# Patient Record
Sex: FEMALE | Race: BLACK OR AFRICAN AMERICAN | Employment: PART TIME | URBAN - METROPOLITAN AREA
[De-identification: names, ages, dates, MRNs, and addresses within clinical notes are randomized per-mention and may not be internally consistent; named-entity substitution may affect disease eponyms.]

---

## 2017-10-12 PROBLEM — O35.9XX0: Status: ACTIVE | Noted: 2017-10-12

## 2017-10-12 PROBLEM — O99.322: Status: ACTIVE | Noted: 2017-10-12

## 2017-10-12 PROBLEM — O99.332: Status: ACTIVE | Noted: 2017-10-12

## 2017-10-12 PROBLEM — A59.01 TRICHOMONAL VAGINITIS IN PREGNANCY IN SECOND TRIMESTER: Status: ACTIVE | Noted: 2017-10-12

## 2017-10-12 PROBLEM — O09.522 SUPERVISION OF ELDERLY MULTIGRAVIDA IN SECOND TRIMESTER: Status: ACTIVE | Noted: 2017-10-12

## 2017-10-12 PROBLEM — O09.32 LATE PRENATAL CARE AFFECTING PREGNANCY IN SECOND TRIMESTER: Status: ACTIVE | Noted: 2017-10-12

## 2017-10-12 PROBLEM — O23.592 TRICHOMONAL VAGINITIS IN PREGNANCY IN SECOND TRIMESTER: Status: ACTIVE | Noted: 2017-10-12

## 2017-10-12 PROBLEM — O35.9XX0: Status: RESOLVED | Noted: 2017-10-12 | Resolved: 2017-10-12

## 2017-10-12 PROBLEM — F19.10: Status: ACTIVE | Noted: 2017-10-12

## 2017-10-19 PROBLEM — Z36.82 NUCHAL TRANSLUCENCY OF FETUS ON PRENATAL ULTRASOUND: Status: ACTIVE | Noted: 2017-10-19

## 2017-11-09 PROBLEM — O09.523: Status: ACTIVE | Noted: 2017-10-12

## 2017-11-09 PROBLEM — O09.33 LATE PRENATAL CARE AFFECTING PREGNANCY IN THIRD TRIMESTER: Status: ACTIVE | Noted: 2017-10-12

## 2017-11-09 PROBLEM — O99.323 MATERNAL DRUG ABUSE IN THIRD TRIMESTER (HCC): Status: ACTIVE | Noted: 2017-10-12

## 2017-11-09 PROBLEM — O99.333 TOBACCO SMOKING COMPLICATING PREGNANCY, THIRD TRIMESTER: Status: ACTIVE | Noted: 2017-10-12

## 2017-11-09 PROBLEM — O35.BXX0 VENTRICULAR SEPTAL DEFECT (VSD) OF FETUS IN SINGLETON PREGNANCY, ANTEPARTUM: Status: ACTIVE | Noted: 2017-11-09

## 2017-12-07 PROBLEM — O23.592 TRICHOMONAL VAGINITIS IN PREGNANCY IN SECOND TRIMESTER: Status: RESOLVED | Noted: 2017-10-12 | Resolved: 2017-12-07

## 2017-12-07 PROBLEM — A59.01 TRICHOMONAL VAGINITIS IN PREGNANCY IN SECOND TRIMESTER: Status: RESOLVED | Noted: 2017-10-12 | Resolved: 2017-12-07

## 2018-01-14 ENCOUNTER — HOSPITAL ENCOUNTER (INPATIENT)
Age: 38
LOS: 2 days | Discharge: HOME | DRG: 560 | End: 2018-01-16
Attending: OBSTETRICS & GYNECOLOGY | Admitting: OBSTETRICS & GYNECOLOGY
Payer: COMMERCIAL

## 2018-01-14 ENCOUNTER — HOSPITAL ENCOUNTER (EMERGENCY)
Age: 38
Discharge: ANOTHER HOSPITAL | DRG: 560 | End: 2018-01-14
Attending: EMERGENCY MEDICINE
Payer: COMMERCIAL

## 2018-01-14 VITALS
DIASTOLIC BLOOD PRESSURE: 82 MMHG | HEIGHT: 64 IN | OXYGEN SATURATION: 100 % | WEIGHT: 236 LBS | HEART RATE: 84 BPM | BODY MASS INDEX: 40.29 KG/M2 | TEMPERATURE: 98.7 F | SYSTOLIC BLOOD PRESSURE: 149 MMHG | RESPIRATION RATE: 22 BRPM

## 2018-01-14 LAB
ABO + RH BLD: NORMAL
AMPHET UR QL SCN: NEGATIVE
BACTERIA SPEC CULT: NORMAL
BARBITURATES UR QL SCN: NEGATIVE
BASOPHILS # BLD: 0 K/UL (ref 0–0.2)
BASOPHILS NFR BLD: 0 % (ref 0–2)
BENZODIAZ UR QL: NEGATIVE
BLOOD GROUP ANTIBODIES SERPL: NORMAL
CANNABINOIDS UR QL SCN: POSITIVE
COCAINE UR QL SCN: NEGATIVE
DIFFERENTIAL METHOD BLD: ABNORMAL
EOSINOPHIL # BLD: 0 K/UL (ref 0–0.8)
EOSINOPHIL NFR BLD: 0 % (ref 0.5–7.8)
ERYTHROCYTE [DISTWIDTH] IN BLOOD BY AUTOMATED COUNT: 16.6 % (ref 11.9–14.6)
GLUCOSE BLD STRIP.AUTO-MCNC: 102 MG/DL (ref 65–100)
GLUCOSE BLD STRIP.AUTO-MCNC: 97 MG/DL (ref 65–100)
HCT VFR BLD AUTO: 34.5 % (ref 35.8–46.3)
HGB BLD-MCNC: 10.7 G/DL (ref 11.7–15.4)
IMM GRANULOCYTES # BLD: 0 K/UL (ref 0–0.5)
IMM GRANULOCYTES NFR BLD AUTO: 0 % (ref 0–5)
LYMPHOCYTES # BLD: 0.8 K/UL (ref 0.5–4.6)
LYMPHOCYTES NFR BLD: 9 % (ref 13–44)
MCH RBC QN AUTO: 20.9 PG (ref 26.1–32.9)
MCHC RBC AUTO-ENTMCNC: 31 G/DL (ref 31.4–35)
MCV RBC AUTO: 67.3 FL (ref 79.6–97.8)
METHADONE UR QL: NEGATIVE
MONOCYTES # BLD: 0.3 K/UL (ref 0.1–1.3)
MONOCYTES NFR BLD: 4 % (ref 4–12)
NEUTS SEG # BLD: 7.9 K/UL (ref 1.7–8.2)
NEUTS SEG NFR BLD: 87 % (ref 43–78)
OPIATES UR QL: POSITIVE
PCP UR QL: NEGATIVE
PLATELET # BLD AUTO: 219 K/UL (ref 150–450)
PMV BLD AUTO: ABNORMAL FL (ref 10.8–14.1)
RBC # BLD AUTO: 5.13 M/UL (ref 4.05–5.25)
SERVICE CMNT-IMP: NORMAL
SPECIMEN EXP DATE BLD: NORMAL
WBC # BLD AUTO: 9.1 K/UL (ref 4.3–11.1)

## 2018-01-14 PROCEDURE — 82962 GLUCOSE BLOOD TEST: CPT

## 2018-01-14 PROCEDURE — 74011250636 HC RX REV CODE- 250/636: Performed by: OBSTETRICS & GYNECOLOGY

## 2018-01-14 PROCEDURE — 77030005518 HC CATH URETH FOL 2W BARD -B

## 2018-01-14 PROCEDURE — 4A1HXCZ MONITORING OF PRODUCTS OF CONCEPTION, CARDIAC RATE, EXTERNAL APPROACH: ICD-10-PCS | Performed by: OBSTETRICS & GYNECOLOGY

## 2018-01-14 PROCEDURE — 74011250636 HC RX REV CODE- 250/636

## 2018-01-14 PROCEDURE — 74011250637 HC RX REV CODE- 250/637

## 2018-01-14 PROCEDURE — 65270000029 HC RM PRIVATE

## 2018-01-14 PROCEDURE — 36415 COLL VENOUS BLD VENIPUNCTURE: CPT | Performed by: OBSTETRICS & GYNECOLOGY

## 2018-01-14 PROCEDURE — 74011250637 HC RX REV CODE- 250/637: Performed by: OBSTETRICS & GYNECOLOGY

## 2018-01-14 PROCEDURE — 86850 RBC ANTIBODY SCREEN: CPT | Performed by: OBSTETRICS & GYNECOLOGY

## 2018-01-14 PROCEDURE — 99285 EMERGENCY DEPT VISIT HI MDM: CPT | Performed by: EMERGENCY MEDICINE

## 2018-01-14 PROCEDURE — 80307 DRUG TEST PRSMV CHEM ANLYZR: CPT | Performed by: OBSTETRICS & GYNECOLOGY

## 2018-01-14 PROCEDURE — 87641 MR-STAPH DNA AMP PROBE: CPT | Performed by: OBSTETRICS & GYNECOLOGY

## 2018-01-14 PROCEDURE — 59409 OBSTETRICAL CARE: CPT | Performed by: EMERGENCY MEDICINE

## 2018-01-14 PROCEDURE — 74011000250 HC RX REV CODE- 250: Performed by: OBSTETRICS & GYNECOLOGY

## 2018-01-14 PROCEDURE — 85025 COMPLETE CBC W/AUTO DIFF WBC: CPT | Performed by: OBSTETRICS & GYNECOLOGY

## 2018-01-14 RX ORDER — OXYTOCIN/RINGER'S LACTATE 15/250 ML
250 PLASTIC BAG, INJECTION (ML) INTRAVENOUS ONCE
Status: ACTIVE | OUTPATIENT
Start: 2018-01-14 | End: 2018-01-15

## 2018-01-14 RX ORDER — MISOPROSTOL 200 UG/1
200 TABLET ORAL ONCE
Status: COMPLETED | OUTPATIENT
Start: 2018-01-14 | End: 2018-01-14

## 2018-01-14 RX ORDER — OXYTOCIN/RINGER'S LACTATE 30/500 ML
PLASTIC BAG, INJECTION (ML) INTRAVENOUS
Status: COMPLETED
Start: 2018-01-14 | End: 2018-01-14

## 2018-01-14 RX ORDER — OXYCODONE HYDROCHLORIDE 5 MG/1
5 TABLET ORAL
Status: DISCONTINUED | OUTPATIENT
Start: 2018-01-14 | End: 2018-01-16 | Stop reason: HOSPADM

## 2018-01-14 RX ORDER — LIDOCAINE HYDROCHLORIDE 20 MG/ML
JELLY TOPICAL AS NEEDED
Status: DISCONTINUED | OUTPATIENT
Start: 2018-01-14 | End: 2018-01-14 | Stop reason: HOSPADM

## 2018-01-14 RX ORDER — PRENATAL VIT 96/IRON FUM/FOLIC 27MG-0.8MG
1 TABLET ORAL DAILY
Status: DISCONTINUED | OUTPATIENT
Start: 2018-01-15 | End: 2018-01-16 | Stop reason: HOSPADM

## 2018-01-14 RX ORDER — OXYCODONE HYDROCHLORIDE 5 MG/1
10 TABLET ORAL
Status: DISCONTINUED | OUTPATIENT
Start: 2018-01-14 | End: 2018-01-16 | Stop reason: HOSPADM

## 2018-01-14 RX ORDER — SODIUM CHLORIDE, SODIUM LACTATE, POTASSIUM CHLORIDE, CALCIUM CHLORIDE 600; 310; 30; 20 MG/100ML; MG/100ML; MG/100ML; MG/100ML
1000 INJECTION, SOLUTION INTRAVENOUS AS NEEDED
Status: DISCONTINUED | OUTPATIENT
Start: 2018-01-14 | End: 2018-01-16 | Stop reason: HOSPADM

## 2018-01-14 RX ORDER — MISOPROSTOL 200 UG/1
TABLET ORAL
Status: COMPLETED
Start: 2018-01-14 | End: 2018-01-14

## 2018-01-14 RX ORDER — LIDOCAINE HYDROCHLORIDE 10 MG/ML
INJECTION INFILTRATION; PERINEURAL
Status: ACTIVE
Start: 2018-01-14 | End: 2018-01-14

## 2018-01-14 RX ORDER — METFORMIN HYDROCHLORIDE 500 MG/1
500 TABLET ORAL 2 TIMES DAILY WITH MEALS
Status: DISCONTINUED | OUTPATIENT
Start: 2018-01-14 | End: 2018-01-16 | Stop reason: HOSPADM

## 2018-01-14 RX ORDER — PROMETHAZINE HYDROCHLORIDE 25 MG/1
25 TABLET ORAL
Status: DISCONTINUED | OUTPATIENT
Start: 2018-01-14 | End: 2018-01-16 | Stop reason: HOSPADM

## 2018-01-14 RX ORDER — HYDROCODONE BITARTRATE AND ACETAMINOPHEN 5; 325 MG/1; MG/1
1 TABLET ORAL
Status: DISCONTINUED | OUTPATIENT
Start: 2018-01-14 | End: 2018-01-16 | Stop reason: HOSPADM

## 2018-01-14 RX ORDER — PROMETHAZINE HYDROCHLORIDE 25 MG/ML
INJECTION, SOLUTION INTRAMUSCULAR; INTRAVENOUS
Status: COMPLETED
Start: 2018-01-14 | End: 2018-01-14

## 2018-01-14 RX ORDER — SIMETHICONE 80 MG
80 TABLET,CHEWABLE ORAL
Status: DISCONTINUED | OUTPATIENT
Start: 2018-01-14 | End: 2018-01-16 | Stop reason: HOSPADM

## 2018-01-14 RX ORDER — DIPHENHYDRAMINE HCL 25 MG
25 CAPSULE ORAL
Status: DISCONTINUED | OUTPATIENT
Start: 2018-01-14 | End: 2018-01-16 | Stop reason: HOSPADM

## 2018-01-14 RX ORDER — ZOLPIDEM TARTRATE 5 MG/1
5 TABLET ORAL
Status: DISCONTINUED | OUTPATIENT
Start: 2018-01-14 | End: 2018-01-16 | Stop reason: HOSPADM

## 2018-01-14 RX ORDER — DOCUSATE SODIUM 100 MG/1
100 CAPSULE, LIQUID FILLED ORAL
Status: DISCONTINUED | OUTPATIENT
Start: 2018-01-14 | End: 2018-01-16 | Stop reason: HOSPADM

## 2018-01-14 RX ORDER — MORPHINE SULFATE 2 MG/ML
4 INJECTION, SOLUTION INTRAMUSCULAR; INTRAVENOUS ONCE
Status: COMPLETED | OUTPATIENT
Start: 2018-01-14 | End: 2018-01-14

## 2018-01-14 RX ORDER — MORPHINE SULFATE 2 MG/ML
INJECTION, SOLUTION INTRAMUSCULAR; INTRAVENOUS
Status: COMPLETED
Start: 2018-01-14 | End: 2018-01-14

## 2018-01-14 RX ORDER — IBUPROFEN 600 MG/1
600 TABLET ORAL
Status: DISCONTINUED | OUTPATIENT
Start: 2018-01-14 | End: 2018-01-16 | Stop reason: HOSPADM

## 2018-01-14 RX ADMIN — HYDROCODONE BITARTRATE AND ACETAMINOPHEN 1 TABLET: 5; 325 TABLET ORAL at 17:20

## 2018-01-14 RX ADMIN — PROMETHAZINE HYDROCHLORIDE 12.5 MG: 25 INJECTION INTRAMUSCULAR; INTRAVENOUS at 12:14

## 2018-01-14 RX ADMIN — METFORMIN HYDROCHLORIDE 500 MG: 500 TABLET, FILM COATED ORAL at 17:20

## 2018-01-14 RX ADMIN — LIDOCAINE HYDROCHLORIDE: 20 JELLY TOPICAL at 12:45

## 2018-01-14 RX ADMIN — MORPHINE SULFATE 4 MG: 2 INJECTION, SOLUTION INTRAMUSCULAR; INTRAVENOUS at 12:15

## 2018-01-14 RX ADMIN — IBUPROFEN 600 MG: 600 TABLET ORAL at 20:24

## 2018-01-14 RX ADMIN — MISOPROSTOL 200 MCG: 200 TABLET ORAL at 12:07

## 2018-01-14 RX ADMIN — DOCUSATE SODIUM 100 MG: 100 CAPSULE, LIQUID FILLED ORAL at 17:20

## 2018-01-14 RX ADMIN — OXYCODONE HYDROCHLORIDE 10 MG: 5 TABLET ORAL at 21:55

## 2018-01-14 RX ADMIN — WITCH HAZEL 1 PAD: 500 SOLUTION RECTAL; TOPICAL at 17:20

## 2018-01-14 RX ADMIN — SODIUM CHLORIDE, SODIUM LACTATE, POTASSIUM CHLORIDE, AND CALCIUM CHLORIDE 1000 ML/HR: 600; 310; 30; 20 INJECTION, SOLUTION INTRAVENOUS at 11:40

## 2018-01-14 RX ADMIN — OXYTOCIN 30000 MILLI-UNITS: 10 INJECTION, SOLUTION INTRAMUSCULAR; INTRAVENOUS at 11:50

## 2018-01-14 NOTE — PROGRESS NOTES
Dr Carlena Buerger given update on patient status; reported elevated blood pressure readings, hx of Type 2 Diabetes, currently taking Metformin 1000 mg ER BID, positive UDS; new order received for B/P parameters and Metformin 500 mg BID    Metformin ER unavailable per Berkley Hummel, PHARMD, will substitute with metformin    Trino Mcguire, primary RN, notified

## 2018-01-14 NOTE — PROGRESS NOTES
SBAR IN Report: Mother    Verbal report received from Willian Sterling on this patient, who is now being transferred from [facility] for routine progression of care. The patient is not wearing a green \"Anesthesia-Duramorph\" band. Report consisted of patient's Situation, Background, Assessment and Recommendations (SBAR). [place] ID bands were compared with the identification form, and verified with the patient and transferring nurse. Information from the SBAR, Intake/Output and Recent Results and the [place] Report was reviewed with the transferring nurse; opportunity for questions and clarification provided.

## 2018-01-14 NOTE — ED PROVIDER NOTES
HPI Comments: Patient is a 28-year-old female  who is approximately 38-1/2 weeks pregnant who was brought to the emergency department today with some contractions. She states it started several hours ago with a little bit of bleeding. She reports she is raiza about every 3 minutes. She states that the baby's father got scared and nervous and drove to the wrong hospital by accident. She denies any complications with her prenatal care. Patient is a 44 y.o. female presenting with contractions. The history is provided by the patient. Contractions    This is a new problem. The current episode started 1 to 2 hours ago. The problem occurs constantly. The problem has been gradually worsening. Past Medical History:   Diagnosis Date    Abnormal Papanicolaou smear of cervix     resolved    Anemia     Calculus of gallbladder 2016    Chlamydia     tx    Choledocholithiasis 2016    CKD (chronic kidney disease)     Diabetes (HCC)     metformin only- does not take BS at home- does not know last A1c- denies hypoglycemia    Elevated fasting blood sugar     Endocrine disease     hypothyroidism    Essential hypertension     no current medications--since     Genital herpes     never had an outbreak    Genital herpes, unspecified     Gestational diabetes     Hypertension     CHTN not currently on med    Hyperthyroidism     was on Methimazole during last preg. not currently on anything.     Marijuana use     twice daily per pt    Obesity (BMI 30-39.9) 2016    BMI 37.2    Smoker     5 cig daily since age 14       Past Surgical History:   Procedure Laterality Date    HX GI      EGD    HX LAP CHOLECYSTECTOMY           Family History:   Problem Relation Age of Onset    Cancer Mother      cervical    Diabetes Mother     Stroke Mother     Cancer Maternal Grandmother      breast ca    Diabetes Maternal Grandmother     Heart Disease Maternal Grandmother        Social History Social History    Marital status: SINGLE     Spouse name: N/A    Number of children: N/A    Years of education: N/A     Occupational History    Not on file. Social History Main Topics    Smoking status: Current Every Day Smoker     Packs/day: 0.25     Years: 18.00     Start date: 12/14/1994    Smokeless tobacco: Never Used      Comment: 4-5 cigarettes daily    Alcohol use Yes      Comment: 2-3 x year    Drug use: Yes     Special: Marijuana      Comment: 1 x 2 times daily last smoked, quit 09/21/17    Sexual activity: Yes     Partners: Male     Other Topics Concern    Not on file     Social History Narrative         ALLERGIES: Review of patient's allergies indicates no known allergies. Review of Systems   Constitutional: Negative. HENT: Negative. Respiratory: Negative. Cardiovascular: Negative. Endocrine: Negative. Neurological: Negative. Vitals:    01/14/18 1033 01/14/18 1034 01/14/18 1035   BP: 174/84     Pulse:  (!) 107 96   SpO2:  100% 100%            Physical Exam   Constitutional: She appears well-developed and well-nourished. HENT:   Head: Normocephalic and atraumatic. Cardiovascular: Normal rate, regular rhythm and intact distal pulses. Pulmonary/Chest: Effort normal and breath sounds normal.   Abdominal:   Abdomen abdomen having contractions   Genitourinary:   Genitourinary Comments: initial bimanual examination with sterile gloves showed a dilated cervix however the head was still high in the pelvis. There was some clear fluid leakage and a small amount of blood on the glove. We did not have any nitrazine paper available for testing. Musculoskeletal: She exhibits no edema. Skin: Skin is warm and dry. No rash noted. MDM  Number of Diagnoses or Management Options  Diagnosis management comments: A few minutes after I left the room and was trying to contact obstetrics the nurse jomar for help back in the room.   When I reentered the room the patient had already delivered a male fetus. The nurse was holding the baby. We immediately wrapped the baby warmed house and dried his color was pink and warm with an Apgar score of 9. We provided some suction to the mouth with the baby we then clamped and cut the cord. Over the next 10-15 minutes the mother had a few more contractions and gradually passed the placenta on her own in full. EMS arrived to transport mother and baby to the [facility] for continued care. Discharged her story spoken with the patient's obstetrician who is accepted the patient at the other hospital.    Risk of Complications, Morbidity, and/or Mortality  Presenting problems: high  Diagnostic procedures: minimal  Management options: high    Patient Progress  Patient progress: improved    ED Course   Voice dictation software was used during the making of this note. This software is not perfect and grammatical and other typographical errors may be present. This note has been proofread, but may still contain errors.   Sim Jay MD; 2018 @11:06 AM   ===================================================================        Procedures

## 2018-01-14 NOTE — ED NOTES
Baby delivered in ER  Boy, healthy appearing, o2 sat 96% RA, heart rate 155. Baby boy placed in warmed and wrapped in warm blankets. Umbilical cord clamped by MDs.  Mother and baby both stable at this time

## 2018-01-14 NOTE — ED NOTES
Pt with contractions less than 2 min apart. Pt stated \"I have to push\" and this RN noted baby head protruding, Medic at bedside initiating PIV. Baby delivered on stretcher, Infant turned face down and back patted, cry initiated. Assistance arrived at the bedside, infant wrapped in warm blanket and to MD for suction and assessment.

## 2018-01-14 NOTE — ED NOTES
L&D Triage at [facility] [address] called and made aware of patient transfer. Cyndy Meza called for emergency transfer.

## 2018-01-14 NOTE — PROGRESS NOTES
Patient reported SROM before delivery of infant       01/14/18 1042   Membranes   Membrane Status SROM   Rupture Date 01/14/18   Rupture Time 1042   Amniotic Fluid Description Unable to determine

## 2018-01-14 NOTE — PROGRESS NOTES
Unable to complete admission questions; patient using telephone; stated children and FOB on side of road, ran out of gas, asking for money

## 2018-01-14 NOTE — PROGRESS NOTES
UDS resulted- Pt has already been medicated with MS and Phenergan at time of urine collection:    Results for Trace Gandhi (MRN [id number]) as of 1/14/2018 14:31   Ref.  Range 1/14/2018 13:06   AMPHETAMINES Latest Units:   NEGATIVE   BARBITURATES Latest Units:   NEGATIVE   BENZODIAZEPINES Latest Units:   NEGATIVE   COCAINE Latest Units:   NEGATIVE   METHADONE Latest Units:   NEGATIVE   OPIATES Latest Units:   POSITIVE   PCP(PHENCYCLIDINE) Latest Units:   NEGATIVE   THC (TH-CANNABINOL) Latest Units:   POSITIVE   DRUG SCREEN, URINE Unknown Rpt

## 2018-01-14 NOTE — PROGRESS NOTES
SBAR OUT Report: Mother    Verbal report given to Carolyn Cheney RN (full name & credentials) on this patient, who is now being transferred to Mom Infant (unit) for routine progression of care. The patient is not wearing a green \"Anesthesia-Duramorph\" band. Report consisted of patient's Situation, Background, Assessment and Recommendations (SBAR).  ID bands were compared with the identification form, and verified with the patient and receiving nurse. Information from the SBAR, [place], Intake/Output, MAR and Recent Results and the [place] Report was reviewed with the receiving nurse; opportunity for questions and clarification provided.

## 2018-01-14 NOTE — PROGRESS NOTES
Clot expelled during fundal assessment; patient reported history of HSV 1 & 2; denied taking valtrex prophylaxis; patient stated unaware if she has had outbreak during pregnancy or cannot recall last outbreak, stated has \"boil\" on buttocks at this time       01/14/18 1151   Maternal Vital Signs   Pulse (Heart Rate) 81   /89   MAP (Calculated) 111   Postpartum Assessment   Fundus Firm   Fundus location At umbilicus; Midline   Lochia Heavy; Clots;Rubra; Pad change  (baseball sized clot expelled)   Pre-Eclampsia Assessment   Headache No   Visual Disturbances No   Epigastric Pain No   Respiratory Pattern Regular   Spinal Dermatomes   Motor Function Ambulate w/o assistance

## 2018-01-14 NOTE — H&P
[facility] H&P      Assessment/Plan    Problem List  Date Reviewed: 2018          Codes Class    Normal delivery at term ICD-10-CM: O80  ICD-9-CM: 650         Ventricular septal defect (VSD) of fetus in smith pregnancy, antepartum ICD-10-CM: O35. 8XX0  ICD-9-CM: 655.80     Overview Addendum 2017 11:28 AM by Lele Pantoja MD     2017 at [facility]:  Appropriate fetal growth, reassuring fetal status; small suspected VSD seen. AC 55%, overall 64%, LOLA 17 cm, UA Dopplers WNL, BPP 8/8.  2017 [facility]: Peds cards echo today, see report from Dr Tima Barrow; unable to see VSDs. Recommend  follow-up 2 weeks after delivery. 2017 at [facility]: Appropriate fetal growth without IUGR, reassuring fetal status. AC 64, overall 49%, LOLA 17.2 cm, UA Dopplers WNL, BPP 8/8. Small VSD seen previously and evaluated by [facility]. · Notify Pediatricians at delivery to evaluate. · Set Up  echo with Dr. Tima Barrow 2 weeks after delivery as outpatient. Elderly multigravida, currently pregnant, third trimester ICD-10-CM: O09.523  ICD-9-CM: 659.63     Overview Addendum 2017 11:12 AM by Josey Schuler     10/12/2017 at [facility]:  AC 58%, overall 56%, LOLA 25 cm, CL 3.7cm  2017 at [facility]:  Appropriate fetal growth, reassuring fetal status; suspected VSD seen. AC 55%, overall 64%, LOLA 17 cm, UA Dopplers WNL, BPP 8/8.  2017 at [facility]:  Appropriate fetal growth, reassuring fetal status; probable small VSDs. AC 58%, overall 55%, LOLA 19.1 cm, UA Dopplers WNL, BPP 8/8.  2017 at [facility]: Appropriate fetal growth without IUGR, reassuring fetal status. AC 64, overall 49%, LOLA 17.2 cm, UA Dopplers WNL, BPP 8/8. Small VSD seen previously and evaluated by [facility].  Unable to evaluate heart due to fetal position today               Tobacco smoking complicating pregnancy, third trimester ICD-10-CM: O99.333  ICD-9-CM: 649.03     Overview Addendum 2017 10:45 AM by Temitope Chacon 10/12/2017 at [facility]:  Admits to smoking 4-5 cig a day  10/19/2017 at [facility]:  Currently smoking 5 cigarettes/day. 11/9/2017 at [facility]:  Smoking 3-4 cigarettes/day. 12/7/2017 at [facility]:  Continues to smoke 3-4 cigarettes/day. 12/28/2017 at [facility]: Smoking 4-5 cigarettes daily      · The patient was counseled on the dangers of tobacco use, and was advised to quit. Reviewed strategies to maximize success, including removing cigarettes and smoking materials from environment. Late prenatal care affecting pregnancy in third trimester ICD-10-CM: O09.33  ICD-9-CM: V23.7     Overview Addendum 11/9/2017  9:34 AM by Katelin Faria RN     10/12/2017 at [facility]:  Recently found out she was pregnant 3 weeks ago 10/3 at approx 21 weeks. Was not taking any medications, was smoking and admits to smoking THC 2 times daily until 10/3. Maternal drug abuse in third trimester ICD-10-CM: O99.323, F19.10  ICD-9-CM: 648.43, 305.90     Overview Addendum 11/9/2017  9:34 AM by Katelin Faria RN     10/12/2017 at [facility]:   As of 3 weeks ago was using THC twice daily. None since then. UDS + on 10/3/17 for THC. Pre-exist hyp chronic kidney disease comp preg, third tri ICD-10-CM: O10.213, I12.9, N18.9  ICD-9-CM: 642.23, 403.90, 585.9     Overview Addendum 12/28/2017 11:42 AM by Jeffry Waggoner MD     12/28/20178796-ETYH-20 hour urine was 400 mgs, BP normal, this is probably due to underlying renal disease. At high risk of developing preeclampsia. · Repeat preeclamptic labs if SBP > 139. · Preeclamptic warnings stressed. Maternal obesity, antepartum, third trimester ICD-10-CM: O99.213  ICD-9-CM: 649.13, 278.00         Pre-existing hypertension in third trimester, antepartum ICD-10-CM: O10.913  ICD-9-CM: 642.03     Overview Addendum 12/28/2017 11:45 AM by Jeffry Waggoner MD       12/28/2017 at [facility]: BP normal again, no meds currently, denies preeclamptic symptoms.   Has not had any BP elevations this pregnancy but does have underlying renal disease with 400 mgs Proteinuria. 1-2+ edema present, no significant change. · No indication for delivery before 39 weeks at this time. · If HTN occurs, needs preeclampsia work up. · Preeclamptic warnings stressed. Pre-existing type 2 diabetes mellitus during pregnancy in third trimester ICD-10-CM: O24.113  ICD-9-CM: 648.03, 250.00     Overview Addendum 12/28/2017 11:47 AM by Faye Reynolds MD     History of DM2. States she took Metformin with last preg. A1c on 10/3 was 6.1    12/28/2017 at [facility]: Continues on Metformin 1000 mg ER BID without problem. Checking glucose and has brought glucometer today with results. Ranges from 66 to 162, most fastings elevated, QID compliance continues to be an issue. I think this control is as good as we can get. Recommendations and Overall Plan  · Stressed need to send logs weekly to our office and your office. · Weekly fetal testing in OB office scheduled by BPP. · Induce at 39 weeks, January 19th, sooner if maternal HTN or preeclampsia developes. · No MFM follow up scheduled, refer back or call for any problems or questions. Hyperthyroidism affecting pregnancy in third trimester ICD-10-CM: O99.283, E05.90  ICD-9-CM: 648.13, 242.90     Overview Addendum 12/28/2017 10:35 AM by Kyara Primus     Was previously on methimazole. Hasn't taken for last 2 years  10/19/2017 at [facility]:  Most recent TFT's reviewed. Not currently on meds. 11/9/2017 at [facility]:  Most recent TFT's noted below. Normal free T4, not currently on meds. Does not have hyperthyroidism. 12/28/2017 at [facility]: Most recent TFT's noted done 12/12/2017: T4 Free 0.94 and TSH 0.11 (L). Not currently on meds. 10/3/2017                        T4, Free 1.13                                     TSH 0.01 (L)                                  · Goal to keep free T4 within normal limits.                    Subjective    Shweta Becerra 44 y.o. G6  38w2d  presented to L&D S/P precipitous  at [facility] ED.  (+) vag bleeding with PP pain. Placenta del spont (per pt). OB History    Para Term  AB Living   6 3 3  2 3   SAB TAB Ectopic Molar Multiple Live Births   2    0 3      # Outcome Date GA Lbr Jose Juan/2nd Weight Sex Delivery Anes PTL Lv   6 Current            5 Term 09/23/15 37w3d  7 lb 9.5 oz (3.445 kg) F VAGINAL DELI None N MYRIAM   4 Term 09 38w0d  7 lb 14 oz (3.572 kg) F Vag-Spont None  MYRIAM   3 Term 10/24/94 37w0d  6 lb 5 oz (2.863 kg) F Vag-Spont  N MYRIAM   2 SAB  5w0d          1 SAB  9w0d                 Past Medical History:   Diagnosis Date    Abnormal Papanicolaou smear of cervix     resolved    Anemia     Calculus of gallbladder 2016    Chlamydia     tx    Choledocholithiasis 2016    CKD (chronic kidney disease)     Diabetes (HCC)     metformin only- does not take BS at home- does not know last A1c- denies hypoglycemia    Elevated fasting blood sugar     Endocrine disease     hypothyroidism    Essential hypertension     no current medications--since     Genital herpes     never had an outbreak    Genital herpes, unspecified     Gestational diabetes     Herpes gestationis     unsure    Herpes simplex virus (HSV) infection     Hypertension     CHTN not currently on med    Hyperthyroidism     was on Methimazole during last preg. not currently on anything.     Marijuana use     twice daily per pt    Obesity (BMI 30-39.9) 2016    BMI 37.2    Smoker     5 cig daily since age 14       Past Surgical History:   Procedure Laterality Date    HX GI      EGD    HX LAP CHOLECYSTECTOMY         Family History   Problem Relation Age of Onset    Cancer Mother      cervical    Diabetes Mother     Stroke Mother     Cancer Maternal Grandmother      breast ca    Diabetes Maternal Grandmother     Heart Disease Maternal Grandmother        Social History     Social History    Marital status: SINGLE Spouse name: N/A    Number of children: N/A    Years of education: N/A     Occupational History    Not on file. Social History Main Topics    Smoking status: Current Every Day Smoker     Packs/day: 0.25     Years: 18.00     Start date: 12/14/1994    Smokeless tobacco: Never Used      Comment: 4-5 cigarettes daily    Alcohol use Yes      Comment: 2-3 x year    Drug use: Yes     Special: Marijuana      Comment: 1 x 2 times daily last smoked, quit 09/21/17    Sexual activity: Yes     Partners: Male     Other Topics Concern    Not on file     Social History Narrative       No Known Allergies      Review of Systems:    Constitutional: No fevers or chills     CV: No chest pain or palpatations    Resp: No SOB or cough    GI: No nausea/vomiting/diarrhea/constipation    Neuro: No HA, no seizure like activity    Skin: No rashes or lesions     : No dysuria or hematuria      Prenatal Record Review    The prenatal record has been reviewed.     Prenatal Labs:   Lab Results   Component Value Date/Time    Rubella, External Immune 03/16/2015    GrBStrep, External Neg 09/20/2015    HBsAg, External Neg 03/16/2015    HIV, External Neg 03/16/2015    RPR, External Non-Reactive 03/16/2015       Objective    Visit Vitals    LMP 11/21/2016         Physical Exam    Gen: alert and cooperative, NAD    HEENT: NCAT    CV: RRR    RESP: CTA bilat    ABD: PP, soft, NT    PELVIC:  nml PP bleeding, no lacerations noted    EXT: trace edema bilat    NEURO: No focal deficits    SKIN: No noted rashes or lesions       Chris Kumar MD  12:12 PM  01/14/18

## 2018-01-14 NOTE — PROGRESS NOTES
Dr Areli Rogers at bedside; reported patient complaint of \"boil\" and history of HSV; see physician note

## 2018-01-14 NOTE — L&D DELIVERY NOTE
Apparent precipitous  at [facility] ED  Upon arrival, mod bleeding, placenta delivered (per pt and no evidence on exam). No lacerations appreciated.   Routine PP care  Court MD Curtis  12:08 PM  18

## 2018-01-14 NOTE — PROGRESS NOTES
Assisted up to the rest room to void with out difficulty voided 300 clear yellow urine. Assisted into the shower as requested encouraged to call out with needs.

## 2018-01-14 NOTE — IP AVS SNAPSHOT
[address] 
[phone] Patient: Judith Duff MRN: [id number] :1980 About your hospitalization You were admitted on:  2018 You last received care in the:  [facility] You were discharged on:  2018 Why you were hospitalized Your primary diagnosis was:  Not on File Your diagnoses also included:  Normal Delivery At Term Follow-up Information Follow up With Details Comments Contact Info Berkley Leslie MD In 6 weeks for postpartum checkup [facility] [address] 
[phone] Your Scheduled Appointments 2018  9:50 AM EST Office Visit with RYDER Palacios ([address] [address]  
[phone] Discharge Orders None A check regina indicates which time of day the medication should be taken. My Medications START taking these medications Instructions Each Dose to Equal  
 Morning Noon Evening Bedtime  
 ibuprofen 600 mg tablet Commonly known as:  MOTRIN Your last dose was: Your next dose is: Take 1 Tab by mouth every six (6) hours as needed. 600 mg CONTINUE taking these medications Instructions Each Dose to Equal  
 Morning Noon Evening Bedtime Blood-Glucose Meter monitoring kit Your last dose was: Your next dose is:    
   
   
 Dispense 1 that is covered by patient's insurance; No refills  
     
   
   
   
  
 calcium carbonate 200 mg calcium (500 mg) Chew Commonly known as:  TUMS Your last dose was: Your next dose is: Take 1 Tab by mouth daily. 1 Tab  
    
   
   
   
  
 ferrous sulfate 325 mg (65 mg iron) EC tablet Commonly known as:  IRON  
 Your last dose was: Your next dose is: Take 1 Tab by mouth three (3) times daily (with meals). 325 mg  
    
   
   
   
  
 glucose blood VI test strips strip Commonly known as:  blood glucose test  
   
Your last dose was: Your next dose is:    
   
   
 by Does Not Apply route five (5) times daily. Dispense 150, 5 Refills Insulin Needles (Disposable) 32 gauge x 5/32\" Ndle Commonly known as:  Juno Nose Your last dose was: Your next dose is:    
   
   
 1 Pen Needle by Does Not Apply route five (5) times daily. 1 Pen Needle Lancets Misc Your last dose was: Your next dose is:    
   
   
 by Not Applicable route five (5) times daily. 5 daily; dispense 150 with 5 refills  
     
   
   
   
  
 metFORMIN  mg tablet Commonly known as:  GLUCOPHAGE XR Your last dose was: Your next dose is: Take 2 Tabs by mouth two (2) times daily (with meals). Indications: type 2 diabetes mellitus 1000 mg  
    
   
   
   
  
 prenatal vit-calcium-iron-fa 27 mg iron- 1 mg Tab Commonly known as:  PRENATAL PLUS with CALCIUM Your last dose was: Your next dose is: Take 1 Tab by mouth daily. Indications: Pregnancy 1 Tab  
    
   
   
   
  
 raNITIdine 150 mg tablet Commonly known as:  ZANTAC Your last dose was: Your next dose is: Take 1 Tab by mouth two (2) times a day. Indications: Heartburn 150 mg  
    
   
   
   
  
  
STOP taking these medications TYLENOL EXTRA STRENGTH 500 mg tablet Generic drug:  acetaminophen Where to Get Your Medications Information on where to get these meds will be given to you by the nurse or doctor. ! Ask your nurse or doctor about these medications  
  ibuprofen 600 mg tablet Discharge Instructions Discharge instruction to follow: Activity: Pelvis rest for 6 weeks No heavy lifting over 15 lbs for 2 weeks No driving for 2 weeks No push/pull motion such as sweeping or vacuuming for 2 weeks No tub baths for 6 weeks Continue using the hygenique wand after each void or bowel movement. If using sitz bath continue until comfortable stopping. If using pat-bottle continue to use until comfortable stopping. Change sanitary pad after each urination or bowel movement. Call MD for the following: 
    Fever over 101 F; pain not relieved by medication; foul smelling vaginal discharge or an increase in vaginal bleeding. Take medication as prescribed. Follow up with MD as order. Vaginal Childbirth: Care Instructions Your Care Instructions Your body will slowly heal in the next few weeks. It is easy to get too tired and overwhelmed during the first weeks after your baby is born. Changes in your hormones can shift your mood without warning. You may find it hard to meet the extra demands on your energy and time. Take it easy on yourself. Follow-up care is a key part of your treatment and safety. Be sure to make and go to all appointments, and call your doctor if you are having problems. It's also a good idea to know your test results and keep a list of the medicines you take. How can you care for yourself at home? · Vaginal bleeding and cramps ¨ After delivery, you will have a bloody discharge from the vagina. This will turn pink within a week and then white or yellow after about 10 days. It may last for 2 to 4 weeks or longer, until the uterus has healed. Use pads instead of tampons until you stop bleeding. ¨ Do not worry if you pass some blood clots, as long as they are smaller than a golf ball. If you have a tear or stitches in your vaginal area, change the pad at least every 4 hours to prevent soreness and infection. ¨ You may have cramps for the first few days after childbirth.  These are normal and occur as the uterus shrinks to normal size. Take an over-the-counter pain medicine, such as acetaminophen (Tylenol), ibuprofen (Advil, Motrin), or naproxen (Aleve), for cramps. Read and follow all instructions on the label. Do not take aspirin, because it can cause more bleeding. ¨ Do not take two or more pain medicines at the same time unless the doctor told you to. Many pain medicines have acetaminophen, which is Tylenol. Too much acetaminophen (Tylenol) can be harmful. · Stitches ¨ If you have stitches, they will dissolve on their own and do not need to be removed. Follow your doctor's instructions for cleaning the stitched area. ¨ Put ice or a cold pack on your painful area for 10 to 20 minutes at a time, several times a day, for the first few days. Put a thin cloth between the ice and your skin. ¨ Sit in a few inches of warm water (sitz bath) 3 times a day and after bowel movements. The warm water helps with pain and itching. If you do not have a tub, a warm shower might help. · Breast fullness ¨ Your breasts may overfill (engorge) in the first few days after delivery. To help milk flow and to relieve pain, warm your breasts in the shower or by using warm, moist towels before nursing. ¨ If you are not nursing, do not put warmth on your breasts or touch your breasts. Wear a tight bra or sports bra and use ice until the fullness goes away. This usually takes 2 to 3 days. ¨ Put ice or a cold pack on your breast after nursing to reduce swelling and pain. Put a thin cloth between the ice and your skin. · Activity ¨ Eat a balanced diet. Do not try to lose weight by cutting calories. Keep taking your prenatal vitamins, or take a multivitamin. ¨ Get as much rest as you can. Try to take naps when your baby sleeps during the day. ¨ Get some exercise every day. But do not do any heavy exercise until your doctor says it is okay. ¨ Wait until you are healed (about 4 to 6 weeks) before you have sexual intercourse. Your doctor will tell you when it is okay to have sex. ¨ Talk to your doctor about birth control. You can get pregnant even before your period returns. Also, you can get pregnant while you are breastfeeding. · Mental health ¨ It is normal to have some sadness, anxiety, sleeplessness, and mood swings after you go home. If you feel upset or hopeless for more than a few days or are having trouble doing the things you need to do, talk to your doctor. · Constipation and hemorrhoids ¨ Drink plenty of fluids, enough so that your urine is light yellow or clear like water. If you have kidney, heart, or liver disease and have to limit fluids, talk with your doctor before you increase the amount of fluids you drink. ¨ Eat plenty of fiber each day. Have a bran muffin or bran cereal for breakfast, and try eating a piece of fruit for a mid-afternoon snack. ¨ For painful, itchy hemorrhoids, put ice or a cold pack on the area several times a day for 10 minutes at a time. Follow this by putting a warm compress on the area for another 10 to 20 minutes or by sitting in a shallow, warm bath. When should you call for help? Call 911 anytime you think you may need emergency care. For example, call if: 
? · You passed out (lost consciousness). ?Call your doctor now or seek immediate medical care if: 
? · You have severe vaginal bleeding. ? · You are dizzy or lightheaded, or you feel like you may faint. ? · You have a fever. ? · You have new or more pain in your belly or pelvis. ? Watch closely for changes in your health, and be sure to contact your doctor if: 
? · Your vaginal bleeding seems to be getting heavier. ? · You have new or worse vaginal discharge. ? · You feel sad, anxious, or hopeless for more than a few days. ? · You do not get better as expected. Where can you learn more? Go to http://inocente-jelena.info/. Enter P062 in the search box to learn more about \"Vaginal Childbirth: Care Instructions. \" Current as of: March 16, 2017 Content Version: 11.4 © 8369-6051 [employer]. Care instructions adapted under license by [employer] (which disclaims liability or warranty for this information). If you have questions about a medical condition or this instruction, always ask your healthcare professional. [address] any warranty or liability for your use of this information. [employer] Announcement We are excited to announce that we are making your provider's discharge notes available to you in [employer]. You will see these notes when they are completed and signed by the physician that discharged you from your recent hospital stay. If you have any questions or concerns about any information you see in [employer], please call the Health Information Department where you were seen or reach out to your Primary Care Provider for more information about your plan of care. Introducing [facility]! Dear Karin Hull: Thank you for requesting a [employer] account. Our records indicate that you already have an active [employer] account. You can access your account anytime at [email or web address] Did you know that you can access your hospital and ER discharge instructions at any time in [employer]? You can also review all of your test results from your hospital stay or ER visit. Additional Information If you have questions, please visit the Frequently Asked Questions section of the [employer] website at [email or web address] Remember, [employer] is NOT to be used for urgent needs. For medical emergencies, dial 911. Now available from your iPhone and Android! Providers Seen During Your Hospitalization Provider Specialty Primary office phone Marilia Aggarwal MD Obstetrics & Gynecology [phone] Germaine Deras MD Obstetrics & Gynecology [phone] Your Primary Care Physician (PCP) Primary Care Physician Office Phone Office Fax [address] You are allergic to the following No active allergies Recent Documentation Breastfeeding? OB Status Smoking Status No Pregnant Current Every Day Smoker Emergency Contacts Name Discharge Info Relation Home Work Mobile GastonKwesi  Other Relative [6] [phone] [address] CAREGIVER [3] Daughter [21] [phone] Kush Munroe DISCHARGE CAREGIVER [3] Boyfriend [17] [phone] Patient Belongings The following personal items are in your possession at time of discharge: 
  Dental Appliances: None  Visual Aid: None      Home Medications: None   Jewelry: None Please provide this summary of care documentation to your next provider. Signatures-by signing, you are acknowledging that this After Visit Summary has been reviewed with you and you have received a copy. Patient Signature:  ____________________________________________________________ Date:  ____________________________________________________________  
  
Louann Elam Provider Signature:  ____________________________________________________________ Date:  ____________________________________________________________

## 2018-01-14 NOTE — ED NOTES
Pt transferred to [facility] with Tanisha Taylor with baby boy.

## 2018-01-14 NOTE — PROGRESS NOTES
Patient arrived by EMS from [facility]; delivered at [facility] ED at 1042; infant placed in radiant warmer upon arrival, Joceline Hanks RN assumed care of infant; infant color appropriate for ethnicty, good tone; patient calm, no distress observed, stated pain 10/10 during fundal assessment       01/14/18 1134   Maternal Vital Signs   Temp 98.3 °F (36.8 °C)   Temp Source Axillary   Pulse (Heart Rate) 79   Resp Rate 20   O2 Sat (%) 98 %   Pulse via Oximetry 79 beats per minute   Level of Consciousness Alert   BP (!) 150/91   MAP (Calculated) 111   BP 1 Method Automatic   BP 1 Location Left arm   BP Patient Position At rest;Head of bed elevated (Comment degrees)   MEWS Score 1   Oxygen Therapy   O2 Device Room air   Pain   Pain Scale 1 Numeric (0 - 10)   Pain Intensity 1 10   Pain Location 1 Abdomen   Pain Orientation 1 Lower   Pain Description 1 Cramping   Pain Intervention(s) 1 Emotional support;MD notified (comment)   Position/ Safety   Safety Bed in lower position;Call light within reach;Nurse at bedside; Upper side rails up;Visitors at bedside; Wheels locked   Neuro   Neuro (WDL) X   Neurologic State Alert   Orientation Level Oriented X4   Cognition Appropriate safety awareness   Speech Clear   LUE Motor Response Purposeful   LLE Motor Response Purposeful   RUE Motor Response Purposeful   RLE Motor Response Purposeful   Respiratory   Respiratory (WDL) WDL   Cardiac   Cardiac (WDL) WDL   Abdominal    Abdominal (WDL) WDL   Last Bowel Movement Date 01/12/18   Abdominal Assessment Constipation;Nausea   Vaginal Bleeding   Vaginal Bleeding Yes  (see fundal assessment)   VTE Prophylaxis (Shift Required Documentation)   Mechanical VTE Orders No   Peripheral Vascular   Peripheral Vascular (WDL) WDL   Modified Keily Score   Activity 2   Respiration 2   Circulation 2   Consciousness 2   O2 Saturation 2   Keily Score 10   Postpartum Assessment   Left Breast Soft    Right  Breast Soft    Skin to Skin No   Nursed at Delivery No   Feeding Method Bottle   Breast Feeding Assistance Offered N/A   Fundus Firm   Fundus location Below umbilicus -1;Midline   Lochia Heavy;Rubra

## 2018-01-15 LAB
GLUCOSE BLD STRIP.AUTO-MCNC: 100 MG/DL (ref 65–100)
GLUCOSE BLD STRIP.AUTO-MCNC: 103 MG/DL (ref 65–100)
GLUCOSE BLD STRIP.AUTO-MCNC: 90 MG/DL (ref 65–100)

## 2018-01-15 PROCEDURE — 82962 GLUCOSE BLOOD TEST: CPT

## 2018-01-15 PROCEDURE — 65270000029 HC RM PRIVATE

## 2018-01-15 PROCEDURE — 75410000003 HC RECOV DEL/VAG/CSECN EA 0.5 HR

## 2018-01-15 PROCEDURE — 74011250637 HC RX REV CODE- 250/637: Performed by: OBSTETRICS & GYNECOLOGY

## 2018-01-15 RX ADMIN — PRENATAL VIT W/ FE FUMARATE-FA TAB 27-0.8 MG 1 TABLET: 27-0.8 TAB at 08:23

## 2018-01-15 RX ADMIN — METFORMIN HYDROCHLORIDE 500 MG: 500 TABLET, FILM COATED ORAL at 08:23

## 2018-01-15 RX ADMIN — IBUPROFEN 600 MG: 600 TABLET ORAL at 20:15

## 2018-01-15 RX ADMIN — IBUPROFEN 600 MG: 600 TABLET ORAL at 13:19

## 2018-01-15 RX ADMIN — HYDROCODONE BITARTRATE AND ACETAMINOPHEN 1 TABLET: 5; 325 TABLET ORAL at 23:44

## 2018-01-15 RX ADMIN — METFORMIN HYDROCHLORIDE 500 MG: 500 TABLET, FILM COATED ORAL at 17:29

## 2018-01-15 RX ADMIN — DOCUSATE SODIUM 100 MG: 100 CAPSULE, LIQUID FILLED ORAL at 17:28

## 2018-01-15 RX ADMIN — IBUPROFEN 600 MG: 600 TABLET ORAL at 04:36

## 2018-01-15 RX ADMIN — HYDROCODONE BITARTRATE AND ACETAMINOPHEN 1 TABLET: 5; 325 TABLET ORAL at 09:35

## 2018-01-15 RX ADMIN — DOCUSATE SODIUM 100 MG: 100 CAPSULE, LIQUID FILLED ORAL at 08:23

## 2018-01-15 NOTE — PROGRESS NOTES
Shift assessment complete. Pt resting quietly in bed and denies further needs at this time. Instructed pt to call out with any questions or concerns and she verbalizes understanding. 04-Jan-2018 16:28

## 2018-01-15 NOTE — PROGRESS NOTES
Post-Partum Day Number 1 Progress Note    Patient doing well post-partum without significant complaint. Voiding without difficulty, normal lochia. Vitals:  No data found. Temp (24hrs), Av.2 °F (36.8 °C), Min:97.3 °F (36.3 °C), Max:99.1 °F (37.3 °C)      Vital signs stable, afebrile. Exam:  Patient without distress. Abdomen soft, fundus firm below level of umbilicus, nontender               Perineum with normal lochia noted. Lower extremities are negative for swelling, cords or tenderness. Lab/Data Review:  Recent Results (from the past 24 hour(s))   TYPE & SCREEN    Collection Time: 18  8:50 PM   Result Value Ref Range    Crossmatch Expiration 2018     ABO/Rh(D) [place] POSITIVE     Antibody screen NEG    GLUCOSE, POC    Collection Time: 18  9:59 PM   Result Value Ref Range    Glucose (POC) 102 (H) 65 - 100 mg/dL   GLUCOSE, POC    Collection Time: 01/15/18  5:48 AM   Result Value Ref Range    Glucose (POC) 100 65 - 100 mg/dL   GLUCOSE, POC    Collection Time: 01/15/18 11:06 AM   Result Value Ref Range    Glucose (POC) 90 65 - 100 mg/dL       Assessment and Plan:  Patient appears to be having uncomplicated post-partum course. Continue routine perineal care and maternal education. Plan discharge tomorrow if no problems occur.

## 2018-01-15 NOTE — PROGRESS NOTES
Chart reviewed. Patient with multiple positive urine drug screens:  + for THC on 10/03/17, + for THC on 17, + for [facility] & opiates on 1/15/18.  (UDS on 1/15/18 was positive for opiates due to medication provided in hospital). Baby UDS + for THC on 1/15/18; MDS pending.  met with patient who confirmed receiving late prenatal care (10/3/17 @ approx. 21 weeks). Patient states that she was unaware she was pregnant and sought prenatal care once pregnancy was confirmed. Patient was informed of multiple positive urine drug screens during pregnancy and at delivery.  informed patient that DSS will be contacted today due to history of drug use. Patient expressed understanding. Patient confirms history of DSS involvement in  due to marijuana use. Patient states that she has car seat, crib, and all needed items to take care of . She is not currently enrolled in [facility], but is aware of program/enrollment process. Patient plans to formula feed and denies concern about purchasing formula until she is able to get to [facility]. Patient has a total of four children:  Sintia Caldera (: 10/24/94), Dream Hiral Vincent ( 3/3/09), Bhakti Blackman Carlton (: 9/23/15), and Meghann Munroe (: 18). FOB is Andria Perez (: 3/3/74). Address:   [address] [place] [address], [address] [place] [address]  [address] [address]  [phone]     1200:  DSS report made to [phone].    1505: Aneudy reyna/Radha DSS ([phone]) at hospital to see patient. 1545:  Lexi left the hospital without checking-in with this . Lexi informed RN that patient is not allowed to leave the hospital with baby tomorrow. Lexi requested that I contact DSS tomorrow to identify name of assigned . That  will then come to the hospital to complete a Safety Plan for . 1615:   follow-up with family. Questions answered about DSS involvement.   Family was informed that I will contact DSS in the AM to identify .       Gunjan Dobson, [address] [facility]

## 2018-01-16 VITALS
RESPIRATION RATE: 18 BRPM | HEART RATE: 67 BPM | SYSTOLIC BLOOD PRESSURE: 131 MMHG | TEMPERATURE: 97.6 F | DIASTOLIC BLOOD PRESSURE: 77 MMHG | OXYGEN SATURATION: 99 %

## 2018-01-16 LAB
GLUCOSE BLD STRIP.AUTO-MCNC: 86 MG/DL (ref 65–100)
GLUCOSE BLD STRIP.AUTO-MCNC: 87 MG/DL (ref 65–100)
GLUCOSE BLD STRIP.AUTO-MCNC: 90 MG/DL (ref 65–100)

## 2018-01-16 PROCEDURE — 74011250637 HC RX REV CODE- 250/637: Performed by: OBSTETRICS & GYNECOLOGY

## 2018-01-16 RX ORDER — IBUPROFEN 600 MG/1
600 TABLET ORAL
Qty: 30 TAB | Refills: 0 | Status: SHIPPED | OUTPATIENT
Start: 2018-01-16 | End: 2019-05-29

## 2018-01-16 RX ADMIN — IBUPROFEN 600 MG: 600 TABLET ORAL at 03:40

## 2018-01-16 RX ADMIN — METFORMIN HYDROCHLORIDE 500 MG: 500 TABLET, FILM COATED ORAL at 09:16

## 2018-01-16 RX ADMIN — HYDROCODONE BITARTRATE AND ACETAMINOPHEN 1 TABLET: 5; 325 TABLET ORAL at 03:40

## 2018-01-16 RX ADMIN — IBUPROFEN 600 MG: 600 TABLET ORAL at 15:44

## 2018-01-16 RX ADMIN — PRENATAL VIT W/ FE FUMARATE-FA TAB 27-0.8 MG 1 TABLET: 27-0.8 TAB at 09:16

## 2018-01-16 NOTE — PROGRESS NOTES
8:30: E-mail sent to Maira Trujillo ([facility]) to identify name of assigned . 1145:  Phone call to LuisStone [address] ([phone]) to identify name of assigned . No answer; message left. 1200: Per Maira Trujillo, DSS  is Rebecca Salvador  ([phone]). Phone call to Alex Yi, but name on voicemail is not the same. Message left requesting a callback. 1245: Rebecca Salvador with DSS at bedside. 1430:  Copy of DSS safety plan received and placed on chart.     Stella Porter, [address] [facility]

## 2018-01-16 NOTE — DISCHARGE SUMMARY
Obstetrical Discharge Summary     Name: Gibson Arrieta MRN: [id number]  SSN: [id number]    YOB: 1980  Age: 44 y.o. Sex: female      Admit Date: 2018    Discharge Date: 2018     Admitting Physician: Ralf Eubanks MD     Attending Physician:  Tita Grajeda MD     * Admission Diagnoses: delivered;Normal delivery at term    * Discharge Diagnoses:   Information for the patient's :  Eloy Reap [[id number]]   Delivery of a 3.25 kg male infant via Vaginal, Spontaneous Delivery on 2018 at 10:42 AM  by . Apgars were  and .        Additional Diagnoses:   Hospital Problems as of 2018  Date Reviewed: 2018          Codes Class Noted - Resolved POA    Normal delivery at term ICD-10-CM: O80  ICD-9-CM: 038  2018 - Present Unknown             Lab Results   Component Value Date/Time    ABO/Rh(D) O POSITIVE 2018 08:50 PM    Rubella, External Immune 2015    GrBStrep, External Neg 2015      Immunization History   Administered Date(s) Administered    Influenza Vaccine 10/01/2016    Influenza Vaccine (Quad) Mdck Pf 10/30/2017    Influenza Vaccine (Quad) PF 2015    Pneumococcal Vaccine (Unspecified Type) 06/10/2014    Td, Adsorbed PF 2015    Tdap 2015       * Procedures: spontaneous vaginal delivery  * No surgery found *      [place]  Depression Scale  I have been able to laugh and see the funny side of things: As much as I always could  I have looked forward with enjoyment to things: As much as I ever did  I have blamed myself unnecessarily when things went wrong: Yes, some of the time  I have been anxious or worried for no good reason: Hardly ever  I have felt scared or panicky for no very good reason: No, not at all  Things have been getting on top of me: No, most of the time I have coped quite well  I have been so unhappy that I have had difficulty sleeping: No, not at all  I have felt sad or miserable: Not very often  I have been so unhappy that I have been crying: Only occasionally  The thought of harming myself has occurred to me: Never  Total Score: 6    * Discharge Condition: stable    * Hospital Course: Normal hospital course following the delivery. * Disposition: Home    Discharge Medications:   Current Discharge Medication List      START taking these medications    Details   ibuprofen (MOTRIN) 600 mg tablet Take 1 Tab by mouth every six (6) hours as needed. Qty: 30 Tab, Refills: 0    Associated Diagnoses: Normal delivery at term         CONTINUE these medications which have NOT CHANGED    Details   metFORMIN ER (GLUCOPHAGE XR) 500 mg tablet Take 2 Tabs by mouth two (2) times daily (with meals). Indications: type 2 diabetes mellitus  Qty: 120 Tab, Refills: 5      ferrous sulfate (IRON) 325 mg (65 mg iron) EC tablet Take 1 Tab by mouth three (3) times daily (with meals). Qty: 90 Tab, Refills: 3      Blood-Glucose Meter monitoring kit Dispense 1 that is covered by patient's insurance; No refills  Qty: 1 Kit, Refills: 0    Associated Diagnoses: Pre-existing type 2 diabetes mellitus during pregnancy in second trimester      Lancets misc by Not Applicable route five (5) times daily. 5 daily; dispense 150 with 5 refills  Qty: 1 Each, Refills: 11    Associated Diagnoses: Pre-existing type 2 diabetes mellitus during pregnancy in second trimester      glucose blood VI test strips (BLOOD GLUCOSE TEST) strip by Does Not Apply route five (5) times daily. Dispense 150, 5 Refills  Qty: 150 Strip, Refills: 5    Associated Diagnoses: Pre-existing type 2 diabetes mellitus during pregnancy in second trimester      Insulin Needles, Disposable, (UNIFINE PENTIPS) 32 gauge x 5/32\" ndle 1 Pen Needle by Does Not Apply route five (5) times daily.   Qty: 150 Pen Needle, Refills: 5    Associated Diagnoses: Pre-existing type 2 diabetes mellitus during pregnancy in second trimester      calcium carbonate (TUMS) 200 mg calcium (500 mg) chew Take 1 Tab by mouth daily. raNITIdine (ZANTAC) 150 mg tablet Take 1 Tab by mouth two (2) times a day. Indications: Heartburn  Qty: 60 Tab, Refills: 5    Associated Diagnoses: Supervision of elderly multigravida in second trimester; Maternal obesity, antepartum, second trimester      prenatal vit-calcium-iron-fa (PRENATAL PLUS WITH CALCIUM) 27 mg iron- 1 mg tab Take 1 Tab by mouth daily. Indications: Pregnancy  Qty: 30 Tab, Refills: 6    Associated Diagnoses: Supervision of elderly multigravida in second trimester; Maternal obesity, antepartum, second trimester; Late prenatal care affecting pregnancy in second trimester         STOP taking these medications       acetaminophen (TYLENOL EXTRA STRENGTH) 500 mg tablet Comments:   Reason for Stopping:               * Follow-up Care/Patient Instructions:   Activity: No sex for 6 weeks  Diet: Regular Diet  Wound Care: None needed    Follow-up Information     Follow up With Details Comments [address], [place]   [address]  [phone]             Signed By:  Lynsey Jauregui MD     January 16, 2018

## 2018-01-16 NOTE — DISCHARGE INSTRUCTIONS
Discharge instruction to follow: Activity: Pelvis rest for 6 weeks     No heavy lifting over 15 lbs for 2 weeks     No driving for 2 weeks     No push/pull motion such as sweeping or vacuuming for 2 weeks     No tub baths for 6 weeks    Continue using the hygenique wand after each void or bowel movement. If using sitz bath continue until comfortable stopping. If using pat-bottle continue to use until comfortable stopping. Change sanitary pad after each urination or bowel movement. Call MD for the following:      Fever over 101 F; pain not relieved by medication; foul smelling vaginal discharge or an increase in vaginal bleeding. Take medication as prescribed. Follow up with MD as order. Vaginal Childbirth: Care Instructions  Your Care Instructions    Your body will slowly heal in the next few weeks. It is easy to get too tired and overwhelmed during the first weeks after your baby is born. Changes in your hormones can shift your mood without warning. You may find it hard to meet the extra demands on your energy and time. Take it easy on yourself. Follow-up care is a key part of your treatment and safety. Be sure to make and go to all appointments, and call your doctor if you are having problems. It's also a good idea to know your test results and keep a list of the medicines you take. How can you care for yourself at home? · Vaginal bleeding and cramps  ¨ After delivery, you will have a bloody discharge from the vagina. This will turn pink within a week and then white or yellow after about 10 days. It may last for 2 to 4 weeks or longer, until the uterus has healed. Use pads instead of tampons until you stop bleeding. ¨ Do not worry if you pass some blood clots, as long as they are smaller than a golf ball. If you have a tear or stitches in your vaginal area, change the pad at least every 4 hours to prevent soreness and infection.   ¨ You may have cramps for the first few days after childbirth. These are normal and occur as the uterus shrinks to normal size. Take an over-the-counter pain medicine, such as acetaminophen (Tylenol), ibuprofen (Advil, Motrin), or naproxen (Aleve), for cramps. Read and follow all instructions on the label. Do not take aspirin, because it can cause more bleeding. ¨ Do not take two or more pain medicines at the same time unless the doctor told you to. Many pain medicines have acetaminophen, which is Tylenol. Too much acetaminophen (Tylenol) can be harmful. · Stitches  ¨ If you have stitches, they will dissolve on their own and do not need to be removed. Follow your doctor's instructions for cleaning the stitched area. ¨ Put ice or a cold pack on your painful area for 10 to 20 minutes at a time, several times a day, for the first few days. Put a thin cloth between the ice and your skin. ¨ Sit in a few inches of warm water (sitz bath) 3 times a day and after bowel movements. The warm water helps with pain and itching. If you do not have a tub, a warm shower might help. · Breast fullness  ¨ Your breasts may overfill (engorge) in the first few days after delivery. To help milk flow and to relieve pain, warm your breasts in the shower or by using warm, moist towels before nursing. ¨ If you are not nursing, do not put warmth on your breasts or touch your breasts. Wear a tight bra or sports bra and use ice until the fullness goes away. This usually takes 2 to 3 days. ¨ Put ice or a cold pack on your breast after nursing to reduce swelling and pain. Put a thin cloth between the ice and your skin. · Activity  ¨ Eat a balanced diet. Do not try to lose weight by cutting calories. Keep taking your prenatal vitamins, or take a multivitamin. ¨ Get as much rest as you can. Try to take naps when your baby sleeps during the day. ¨ Get some exercise every day. But do not do any heavy exercise until your doctor says it is okay.   ¨ Wait until you are healed (about 4 to 6 weeks) before you have sexual intercourse. Your doctor will tell you when it is okay to have sex. ¨ Talk to your doctor about birth control. You can get pregnant even before your period returns. Also, you can get pregnant while you are breastfeeding. · Mental health  ¨ It is normal to have some sadness, anxiety, sleeplessness, and mood swings after you go home. If you feel upset or hopeless for more than a few days or are having trouble doing the things you need to do, talk to your doctor. · Constipation and hemorrhoids  ¨ Drink plenty of fluids, enough so that your urine is light yellow or clear like water. If you have kidney, heart, or liver disease and have to limit fluids, talk with your doctor before you increase the amount of fluids you drink. ¨ Eat plenty of fiber each day. Have a bran muffin or bran cereal for breakfast, and try eating a piece of fruit for a mid-afternoon snack. ¨ For painful, itchy hemorrhoids, put ice or a cold pack on the area several times a day for 10 minutes at a time. Follow this by putting a warm compress on the area for another 10 to 20 minutes or by sitting in a shallow, warm bath. When should you call for help? Call 911 anytime you think you may need emergency care. For example, call if:  ? · You passed out (lost consciousness). ?Call your doctor now or seek immediate medical care if:  ? · You have severe vaginal bleeding. ? · You are dizzy or lightheaded, or you feel like you may faint. ? · You have a fever. ? · You have new or more pain in your belly or pelvis. ? Watch closely for changes in your health, and be sure to contact your doctor if:  ? · Your vaginal bleeding seems to be getting heavier. ? · You have new or worse vaginal discharge. ? · You feel sad, anxious, or hopeless for more than a few days. ? · You do not get better as expected. Where can you learn more? Go to http://inocente-jelena.info/.   Enter T292 in the search box to learn more about \"Vaginal Childbirth: Care Instructions. \"  Current as of: March 16, 2017  Content Version: 11.4  © 1392-5129 [employer], [employer]orporated. Care instructions adapted under license by [employer] (which disclaims liability or warranty for this information). If you have questions about a medical condition or this instruction, always ask your healthcare professional. [address] any warranty or liability for your use of this information.

## 2018-01-16 NOTE — PROGRESS NOTES
Shift assessment complete. Pt resting in bed with visitors present. Pt states pain is well controlled and denies further needs at this time.

## 2018-01-16 NOTE — PROGRESS NOTES
Post-Partum Day Number 2 Progress/Discharge Note    Patient doing well post-partum without significant complaint. Voiding without difficulty, normal lochia, positive flatus. Vitals:  Patient Vitals for the past 8 hrs:   BP Temp Pulse Resp   18 0715 131/77 97.6 °F (36.4 °C) 67 18     Temp (24hrs), Av.7 °F (36.5 °C), Min:97.6 °F (36.4 °C), Max:97.8 °F (36.6 °C)      Vital signs stable, afebrile. Exam:  Patient without distress. Abdomen soft, fundus firm below level of umbilicus, non tender               Perineum with normal lochia noted. Lower extremities are negative for swelling, cords or tenderness. Lab/Data Review:      Assessment and Plan:  Patient appears to be having uncomplicated post-partum course. Continue routine perineal care and maternal education. Plan discharge for today with follow up in our office in 6 weeks.

## 2018-01-16 NOTE — PROGRESS NOTES
Discharge teaching complete. Mother verbalized understanding, questions encouraged. [place] sheet signed.

## 2018-01-22 NOTE — PROGRESS NOTES
Phone call to DSS :  Gabriella Magaña ([phone]) regarding + MDS. No answer; message left.     Dao Mcfarlandr, [address] [facility]

## 2018-01-22 NOTE — PROGRESS NOTES
MDS + for [facility]C. Copy of UDS/MDS faxed to [id number].     Jodie Perez, [address] [facility]

## 2018-01-23 NOTE — PROGRESS NOTES
Copy of MDS faxed to [facility] [place] w/[facility]. Fax: [phone].     Annika Lindsey, [address] [facility]

## 2019-05-29 PROBLEM — E66.01 OBESITY, MORBID (HCC): Status: ACTIVE | Noted: 2019-05-29

## 2019-08-01 PROBLEM — O99.333 TOBACCO SMOKING COMPLICATING PREGNANCY, THIRD TRIMESTER: Status: RESOLVED | Noted: 2017-10-12 | Resolved: 2019-08-01

## 2019-08-01 PROBLEM — O09.523: Status: RESOLVED | Noted: 2017-10-12 | Resolved: 2019-08-01

## 2019-08-01 PROBLEM — O35.BXX0 VENTRICULAR SEPTAL DEFECT (VSD) OF FETUS IN SINGLETON PREGNANCY, ANTEPARTUM: Status: RESOLVED | Noted: 2017-11-09 | Resolved: 2019-08-01

## 2019-08-19 PROBLEM — F19.10 MATERNAL DRUG ABUSE IN THIRD TRIMESTER (HCC): Status: RESOLVED | Noted: 2017-10-12 | Resolved: 2019-08-19

## 2019-08-19 PROBLEM — R93.89 ABNORMAL FINDINGS ON DIAGNOSTIC IMAGING OF OTHER SPECIFIED BODY STRUCTURES: Status: ACTIVE | Noted: 2019-08-19

## 2019-08-19 PROBLEM — K83.1 BILIARY STRICTURE: Status: RESOLVED | Noted: 2019-08-19 | Resolved: 2019-08-19

## 2019-08-19 PROBLEM — O99.323 MATERNAL DRUG ABUSE IN THIRD TRIMESTER (HCC): Status: RESOLVED | Noted: 2017-10-12 | Resolved: 2019-08-19

## 2019-08-19 PROBLEM — K83.09 CHOLANGITIS: Status: ACTIVE | Noted: 2019-08-19

## 2019-08-19 PROBLEM — K83.1 BILIARY STRICTURE: Status: ACTIVE | Noted: 2019-08-19

## 2019-08-19 PROBLEM — E11.65 TYPE 2 DIABETES MELLITUS WITH HYPERGLYCEMIA, WITHOUT LONG-TERM CURRENT USE OF INSULIN (HCC): Status: ACTIVE | Noted: 2019-08-19

## 2019-08-19 PROBLEM — K83.09 CHOLANGITIS: Status: RESOLVED | Noted: 2019-08-19 | Resolved: 2019-08-19

## 2019-08-19 PROBLEM — E04.1 NODULE OF LEFT LOBE OF THYROID GLAND: Status: ACTIVE | Noted: 2019-08-19

## 2019-08-19 PROBLEM — E04.1 NODULE OF LEFT LOBE OF THYROID GLAND: Status: RESOLVED | Noted: 2019-08-19 | Resolved: 2019-08-19

## 2019-08-19 PROBLEM — O09.33 LATE PRENATAL CARE AFFECTING PREGNANCY IN THIRD TRIMESTER: Status: RESOLVED | Noted: 2017-10-12 | Resolved: 2019-08-19

## 2019-08-19 PROBLEM — R91.1 LUNG NODULE: Status: ACTIVE | Noted: 2019-08-19

## 2020-10-28 ENCOUNTER — HOSPITAL ENCOUNTER (OUTPATIENT)
Dept: ULTRASOUND IMAGING | Age: 40
Discharge: HOME | End: 2020-10-28
Attending: PHYSICIAN ASSISTANT
Payer: COMMERCIAL

## 2020-10-28 DIAGNOSIS — E05.90 SUBCLINICAL HYPERTHYROIDISM: ICD-10-CM

## 2020-10-28 PROCEDURE — 76536 US EXAM OF HEAD AND NECK: CPT

## 2020-10-28 NOTE — PROGRESS NOTES
No nodules. Will proceed with treatment of graves. Please ensure she goes to ophtho as scheduled and is taking methimazole as ordered with plans to repeat labs 4-6 weeks after starting methimazole.  Please make her aware to reach out to office with any issues

## 2021-12-23 ENCOUNTER — HOSPITAL ENCOUNTER (EMERGENCY)
Age: 41
Discharge: HOME | End: 2021-12-23
Attending: EMERGENCY MEDICINE
Payer: COMMERCIAL

## 2021-12-23 VITALS
RESPIRATION RATE: 18 BRPM | HEART RATE: 76 BPM | WEIGHT: 207 LBS | TEMPERATURE: 98.5 F | OXYGEN SATURATION: 100 % | DIASTOLIC BLOOD PRESSURE: 83 MMHG | HEIGHT: 64 IN | BODY MASS INDEX: 35.34 KG/M2 | SYSTOLIC BLOOD PRESSURE: 131 MMHG

## 2021-12-23 DIAGNOSIS — U07.1 COVID-19: ICD-10-CM

## 2021-12-23 DIAGNOSIS — G44.209 ACUTE NON INTRACTABLE TENSION-TYPE HEADACHE: Primary | ICD-10-CM

## 2021-12-23 LAB
ALBUMIN SERPL-MCNC: 3.1 G/DL (ref 3.5–5)
ALBUMIN/GLOB SERPL: 0.6 {RATIO} (ref 1.2–3.5)
ALP SERPL-CCNC: 232 U/L (ref 50–130)
ALT SERPL-CCNC: 21 U/L (ref 12–65)
ANION GAP SERPL CALC-SCNC: 7 MMOL/L (ref 7–16)
AST SERPL-CCNC: 26 U/L (ref 15–37)
BASOPHILS # BLD: 0 K/UL (ref 0–0.2)
BASOPHILS NFR BLD: 1 % (ref 0–2)
BILIRUB SERPL-MCNC: 0.5 MG/DL (ref 0.2–1.1)
BUN SERPL-MCNC: 6 MG/DL (ref 6–23)
CALCIUM SERPL-MCNC: 9.5 MG/DL (ref 8.3–10.4)
CHLORIDE SERPL-SCNC: 104 MMOL/L (ref 98–107)
CO2 SERPL-SCNC: 24 MMOL/L (ref 21–32)
COVID-19 RAPID TEST, COVR: DETECTED
CREAT SERPL-MCNC: 0.55 MG/DL (ref 0.6–1)
DIFFERENTIAL METHOD BLD: ABNORMAL
EOSINOPHIL # BLD: 0 K/UL (ref 0–0.8)
EOSINOPHIL NFR BLD: 0 % (ref 0.5–7.8)
ERYTHROCYTE [DISTWIDTH] IN BLOOD BY AUTOMATED COUNT: 16.2 % (ref 11.9–14.6)
GLOBULIN SER CALC-MCNC: 5.3 G/DL (ref 2.3–3.5)
GLUCOSE SERPL-MCNC: 122 MG/DL (ref 65–100)
HCT VFR BLD AUTO: 38 % (ref 35.8–46.3)
HGB BLD-MCNC: 11.9 G/DL (ref 11.7–15.4)
IMM GRANULOCYTES # BLD AUTO: 0 K/UL (ref 0–0.5)
IMM GRANULOCYTES NFR BLD AUTO: 1 % (ref 0–5)
LYMPHOCYTES # BLD: 0.4 K/UL (ref 0.5–4.6)
LYMPHOCYTES NFR BLD: 9 % (ref 13–44)
MCH RBC QN AUTO: 21.3 PG (ref 26.1–32.9)
MCHC RBC AUTO-ENTMCNC: 31.3 G/DL (ref 31.4–35)
MCV RBC AUTO: 67.9 FL (ref 79.6–97.8)
MONOCYTES # BLD: 0.4 K/UL (ref 0.1–1.3)
MONOCYTES NFR BLD: 10 % (ref 4–12)
NEUTS SEG # BLD: 3.4 K/UL (ref 1.7–8.2)
NEUTS SEG NFR BLD: 80 % (ref 43–78)
NRBC # BLD: 0 K/UL (ref 0–0.2)
PLATELET # BLD AUTO: 257 K/UL (ref 150–450)
PMV BLD AUTO: 10.8 FL (ref 9.4–12.3)
POTASSIUM SERPL-SCNC: 3.6 MMOL/L (ref 3.5–5.1)
PROT SERPL-MCNC: 8.4 G/DL (ref 6.3–8.2)
RBC # BLD AUTO: 5.6 M/UL (ref 4.05–5.2)
SODIUM SERPL-SCNC: 135 MMOL/L (ref 136–145)
SOURCE, COVRS: ABNORMAL
WBC # BLD AUTO: 4.2 K/UL (ref 4.3–11.1)

## 2021-12-23 PROCEDURE — 96374 THER/PROPH/DIAG INJ IV PUSH: CPT

## 2021-12-23 PROCEDURE — 85025 COMPLETE CBC W/AUTO DIFF WBC: CPT

## 2021-12-23 PROCEDURE — 96375 TX/PRO/DX INJ NEW DRUG ADDON: CPT

## 2021-12-23 PROCEDURE — 87635 SARS-COV-2 COVID-19 AMP PRB: CPT

## 2021-12-23 PROCEDURE — 99283 EMERGENCY DEPT VISIT LOW MDM: CPT

## 2021-12-23 PROCEDURE — 80053 COMPREHEN METABOLIC PANEL: CPT

## 2021-12-23 PROCEDURE — 74011250636 HC RX REV CODE- 250/636

## 2021-12-23 RX ORDER — KETOROLAC TROMETHAMINE 30 MG/ML
15 INJECTION, SOLUTION INTRAMUSCULAR; INTRAVENOUS
Status: COMPLETED | OUTPATIENT
Start: 2021-12-23 | End: 2021-12-23

## 2021-12-23 RX ORDER — METOCLOPRAMIDE HYDROCHLORIDE 5 MG/ML
10 INJECTION INTRAMUSCULAR; INTRAVENOUS
Status: COMPLETED | OUTPATIENT
Start: 2021-12-23 | End: 2021-12-23

## 2021-12-23 RX ORDER — DIPHENHYDRAMINE HYDROCHLORIDE 50 MG/ML
25 INJECTION, SOLUTION INTRAMUSCULAR; INTRAVENOUS
Status: COMPLETED | OUTPATIENT
Start: 2021-12-23 | End: 2021-12-23

## 2021-12-23 RX ADMIN — METOCLOPRAMIDE 10 MG: 5 INJECTION, SOLUTION INTRAMUSCULAR; INTRAVENOUS at 14:24

## 2021-12-23 RX ADMIN — SODIUM CHLORIDE 1000 ML: 900 INJECTION, SOLUTION INTRAVENOUS at 14:23

## 2021-12-23 RX ADMIN — KETOROLAC TROMETHAMINE 15 MG: 30 INJECTION, SOLUTION INTRAMUSCULAR at 14:24

## 2021-12-23 RX ADMIN — DIPHENHYDRAMINE HYDROCHLORIDE 25 MG: 50 INJECTION, SOLUTION INTRAMUSCULAR; INTRAVENOUS at 14:24

## 2021-12-23 NOTE — ED NOTES
I have reviewed discharge instructions with the patient. The patient verbalized understanding. Patient left ED via Discharge Method: ambulatory to Home with self. Opportunity for questions and clarification provided. Patient given 1 scripts. To continue your aftercare when you leave the hospital, you may receive an automated call from our care team to check in on how you are doing. This is a free service and part of our promise to provide the best care and service to meet your aftercare needs.  If you have questions, or wish to unsubscribe from this service please call [phone]. Thank you for Choosing our [facility] Emergency Department.

## 2021-12-23 NOTE — ED TRIAGE NOTES
Pt states intermittent headaches for about three days but this one is not going away with OTC medications. Pt states she vomited once this afternoon. Pt does not have periods due to Depo shot. Masked for triage.

## 2021-12-23 NOTE — ED PROVIDER NOTES
65-year-old female history of diabetes, and hypertension presents to the emergency department with chief complaint of headache x3 days. The headache started on the left side of her forehead and is now across the forehead. Pain is throbbing and constant. Patient states she has had headaches like this before ibuprofen works for the pain ;however, for the past 3 days she has been taking ibuprofen with no relief . Pain today increased by bright lights and noise. Patient endorses some nausea. Denies changes to vision, unilateral weakness, slurred speech, difficulty swallowing, chest pain, vomiting, abdominal pain. The history is provided by the patient. Headache  This is a new problem. The current episode started more than 2 days ago. The problem occurs constantly. The problem has not changed since onset. Associated symptoms include headaches. Pertinent negatives include no chest pain, no abdominal pain and no shortness of breath. Exacerbated by: Lights and sound. Nothing relieves the symptoms. Treatments tried: Patient tried over-the-counter ibuprofen for the past 3 days. The treatment provided no relief. Past Medical History:   Diagnosis Date    Abnormal Papanicolaou smear of cervix     resolved    Anemia     Diabetes ([facility] [address] 2015    metformin only- does not take BS at home- does not know last A1c- denies hypoglycemia    Essential hypertension     no current medications--since 2015    Genital herpes     never had an outbreak    Gestational diabetes     History of chlamydia     tx    History of cholecystectomy 06/25/2016    Hypertension     [facility] not currently on med    Hyperthyroidism     was on Methimazole during last preg. not currently on anything.     Marijuana use     twice daily per pt    Obesity (BMI 30-39.9) 09/28/2016    BMI 37.2    Smoker     5 cig daily since age 14    Ventricular septal defect (VSD) of fetus in smith pregnancy, antepartum 11/9/2017 11/9/2017 at [facility]: Appropriate fetal growth, reassuring fetal status; small suspected VSD seen. AC 55%, overall 64%, LOLA 17 cm, UA Dopplers WNL, BPP 8/8. 2017 [facility]: Peds cards echo today, see report from Dr Tiffanie Cavazos; unable to see VSDs. Recommend  follow-up 2 weeks after delivery. 2017 at [facility]: Appropriate fetal growth without IUGR, reassuring fetal status. AC 64, overall 49%,       Past Surgical History:   Procedure Laterality Date    HX  SECTION  2019    HX GI      EGD    HX GYN      HX LAP CHOLECYSTECTOMY           Family History:   Problem Relation Age of Onset    Cancer Mother         cervical    Diabetes Mother     Stroke Mother     Cancer Maternal Grandmother         breast ca    Diabetes Maternal Grandmother     Heart Disease Maternal Grandmother     No Known Problems Brother        Social History     Socioeconomic History    Marital status: SINGLE     Spouse name: Not on file    Number of children: Not on file    Years of education: Not on file    Highest education level: Not on file   Occupational History    Not on file   Tobacco Use    Smoking status: Current Every Day Smoker     Packs/day: 0.25     Years: 18.00     Pack years: 4.50     Start date: 1994    Smokeless tobacco: Never Used    Tobacco comment: 4-5 cigarettes daily   Vaping Use    Vaping Use: Never used   Substance and Sexual Activity    Alcohol use: Yes     Comment: 2-3 x year    Drug use: Not on file    Sexual activity: Yes     Partners: Male   Other Topics Concern    Not on file   Social History Narrative    Not on file     Social Determinants of Health     Financial Resource Strain:     Difficulty of Paying Living Expenses: Not on file   Food Insecurity:     Worried About Running Out of Food in the Last Year: Not on file    Albert of Food in the Last Year: Not on file   Transportation Needs:     Lack of Transportation (Medical): Not on file    Lack of Transportation (Non-Medical):  Not on file Physical Activity:     Days of Exercise per Week: Not on file    Minutes of Exercise per Session: Not on file   Stress:     Feeling of Stress : Not on file   Social Connections:     Frequency of Communication with Friends and Family: Not on file    Frequency of Social Gatherings with Friends and Family: Not on file    Attends Yazidism Services: Not on file    Active Member of [address] [place] [address] or Organizations: Not on file    Attends Club or Organization Meetings: Not on file    Marital Status: Not on file   Intimate Partner Violence:     Fear of Current or Ex-Partner: Not on file    Emotionally Abused: Not on file    Physically Abused: Not on file    Sexually Abused: Not on file   Housing Stability:     Unable to Pay for Housing in the Last Year: Not on file    Number of [place] in the Last Year: Not on file    Unstable Housing in the Last Year: Not on file         ALLERGIES: Patient has no known allergies. Review of Systems   Constitutional: Negative for chills, diaphoresis, fatigue and fever. Respiratory: Negative for shortness of breath. Cardiovascular: Negative for chest pain, palpitations and leg swelling. Gastrointestinal: Positive for nausea. Negative for abdominal pain and vomiting. Musculoskeletal: Negative for neck pain and neck stiffness. Neurological: Positive for headaches. Negative for dizziness, speech difficulty, weakness, light-headedness and numbness. Vitals:    12/23/21 1240   BP: 131/68   Pulse: 82   Resp: 16   Temp: 98.4 °F (36.9 °C)   SpO2: 100%   Weight: 93.9 kg (207 lb)   Height: 5' 4\" (1.626 m)            Physical Exam  Vitals and nursing note reviewed. Constitutional:       General: She is not in acute distress. Appearance: Normal appearance. She is diaphoretic. She is not ill-appearing or toxic-appearing. HENT:      Head: Normocephalic and atraumatic.       Mouth/Throat:      Mouth: Mucous membranes are moist.   Eyes:      General: No scleral icterus. Extraocular Movements: Extraocular movements intact. Conjunctiva/sclera: Conjunctivae normal.      Pupils: Pupils are equal, round, and reactive to light. Cardiovascular:      Rate and Rhythm: Normal rate and regular rhythm. Pulses: Normal pulses. Heart sounds: Murmur heard. Pulmonary:      Effort: Pulmonary effort is normal.      Breath sounds: Normal breath sounds. Abdominal:      General: Bowel sounds are normal.      Palpations: Abdomen is soft. Tenderness: There is no abdominal tenderness. Musculoskeletal:         General: No swelling, tenderness, deformity or signs of injury. Cervical back: Normal range of motion and neck supple. No rigidity or tenderness. Right lower leg: No edema. Left lower leg: No edema. Lymphadenopathy:      Cervical: No cervical adenopathy. Skin:     General: Skin is warm. Coloration: Skin is not jaundiced or pale. Findings: No bruising or erythema. Neurological:      General: No focal deficit present. Mental Status: She is alert and oriented to person, place, and time. Cranial Nerves: No cranial nerve deficit. Sensory: No sensory deficit. Motor: No weakness. Coordination: Coordination normal.      Gait: Gait normal.   Psychiatric:         Mood and Affect: Mood normal.         Behavior: Behavior normal.         Thought Content: Thought content normal.         Judgment: Judgment normal.          MDM  Number of Diagnoses or Management Options  Acute non intractable tension-type headache: new and requires workup  COVID-19: new and requires workup  Diagnosis management comments: 41 yo female headache x3 days. No relief obtained with over-the-counter NSAIDs. Neuro exam normal.  Blood pressure and vital signs normal.  Very low clinical suspicion for intracranial cause of headache. Patient is stable. Afebrile. Labs ordered out of triage CMP and CBC were unremarkable.   Urine hCG negative. Rapid COVID-19 test added by me, was positive. Patient's lung exam was unremarkable and O2 saturation percent on room air, no need for chest imaging. Patient given migraine cocktail consisting of of Toradol , Reglan , Benadryl while Covid test was running. Patient's headache is mostly gone. I informed of her positive COVID-19 result. Patient declined antibody infusion. Patient educated about isolation/quarantine for the next 10 to 14 days. Patient informed that she needs to let close contacts no so that they may get tested. I asked patient if she had any questions and she did not. I informed patient of the signs and symptoms that would warrant a prompt return to the emergency department such as shortness of breath, chest pain, ability to keep fluids down. verbalized that she understood. Form patient that she should obtain a pulse oximeter due to her O2 saturation into the ER if it is below 95% on while resting. She verbalized she understood. Patient will be discharged home with a COVID-19 work note. Diana Dunn, [address]; 12/23/2021 @3:25 PM Voice dictation software was used during the making of this note. This software is not perfect and grammatical and other typographical errors may be present. This note has not been proofread for errors.  ===================================================================             Amount and/or Complexity of Data Reviewed  Clinical lab tests: reviewed and ordered    Risk of Complications, Morbidity, and/or Mortality  Presenting problems: moderate  Diagnostic procedures: moderate  Management options: low    Patient Progress  Patient progress: improved    ED Course as of 12/23/21 1445   Thu Dec 23, 2021   1444 Spoke with patient and she states that her headache that has decreased substantially and that she is feeling much better. She currently has no questions or other concerns at this time.  [JG]      ED Course User Index  [JG] Rao Valladares, [place], [place]       Procedures

## 2021-12-23 NOTE — DISCHARGE INSTRUCTIONS
Your blood work today was remarkable, and concerning. Covid-19 swab came back positive. Follow-up with your primary care provider. Quarantine for the next 10 to 14 days. Included a handout on COVID-19. It would be great if you got a pulse oximeter at your oxygen level. Into the emergency department if your oxygen level is less than 95%. To the emergency department if you have any of the signs and symptoms that we discussed such as shortness of breath, chest pain, unreducible fever, inability to keep keep fluids down, or the strokelike symptoms that we discussed.

## 2021-12-23 NOTE — Clinical Note
[id number] [address] EMERGENCY DEPT  [address] [address] [phone]    Work/School Note    Date: 12/23/2021     To Whom It May concern:    Anushka Young was evaluated by the following provider(s):  Attending Provider: Cally Lee DO  Physician Assistant: Les Vann, [address] virus is suspected. Per the CDC guidelines we recommend home isolation until the following conditions are all met:    1. At least 10 days have passed since symptoms first appeared and  2. At least 24 hours have passed since last fever without the use of fever-reducing medications and  3.  Symptoms (e.g., cough, shortness of breath) have improved      Sincerely,          CHERYL Pedro

## 2021-12-23 NOTE — ED NOTES
I have reviewed discharge instructions with the patient. The patient verbalized understanding. Patient left ED via Discharge Method: ambulatory to Home with self. Opportunity for questions and clarification provided. Patient given 0 scripts. To continue your aftercare when you leave the hospital, you may receive an automated call from our care team to check in on how you are doing. This is a free service and part of our promise to provide the best care and service to meet your aftercare needs.  If you have questions, or wish to unsubscribe from this service please call [phone]. Thank you for Choosing our [facility] Emergency Department.

## 2021-12-23 NOTE — Clinical Note
[id number] [address] EMERGENCY DEPT  [address] [id number] [phone]    Work/School Note    Date: 12/23/2021     To Whom It May concern:    Angelique Murillo was evaluated by the following provider(s):  Attending Provider: Jessie Sanders DO  Physician Assistant: Josefina Perera, [address] virus is suspected. Per the CDC guidelines we recommend home isolation until the following conditions are all met:    1. At least 10 days have passed since symptoms first appeared and  2. At least 24 hours have passed since last fever without the use of fever-reducing medications and  3.  Symptoms (e.g., cough, shortness of breath) have improved      Sincerely,          Priscilla Levy RN

## 2022-03-19 PROBLEM — R91.1 LUNG NODULE: Status: ACTIVE | Noted: 2019-08-19

## 2022-03-19 PROBLEM — E11.65 TYPE 2 DIABETES MELLITUS WITH HYPERGLYCEMIA, WITHOUT LONG-TERM CURRENT USE OF INSULIN (HCC): Status: ACTIVE | Noted: 2019-08-19

## 2022-03-20 PROBLEM — E66.01 OBESITY, MORBID (HCC): Status: ACTIVE | Noted: 2019-05-29

## 2022-05-12 ENCOUNTER — HOSPITAL ENCOUNTER (OUTPATIENT)
Dept: SLEEP MEDICINE | Age: 42
Discharge: HOME | End: 2022-05-12
Payer: COMMERCIAL

## 2022-05-12 PROCEDURE — 95810 POLYSOM 6/> YRS 4/> PARAM: CPT

## 2022-06-13 ENCOUNTER — OFFICE VISIT (OUTPATIENT)
Dept: FAMILY MEDICINE CLINIC | Facility: CLINIC | Age: 42
End: 2022-06-13
Payer: COMMERCIAL

## 2022-06-13 VITALS
SYSTOLIC BLOOD PRESSURE: 132 MMHG | BODY MASS INDEX: 37.42 KG/M2 | WEIGHT: 218 LBS | RESPIRATION RATE: 17 BRPM | DIASTOLIC BLOOD PRESSURE: 80 MMHG | HEART RATE: 80 BPM

## 2022-06-13 DIAGNOSIS — E05.90 HYPERTHYROIDISM: ICD-10-CM

## 2022-06-13 DIAGNOSIS — R06.1 STRIDOR: Primary | ICD-10-CM

## 2022-06-13 PROCEDURE — 99214 OFFICE O/P EST MOD 30 MIN: CPT | Performed by: FAMILY MEDICINE

## 2022-06-13 ASSESSMENT — ENCOUNTER SYMPTOMS
RESPIRATORY NEGATIVE: 1
EYES NEGATIVE: 1
GASTROINTESTINAL NEGATIVE: 1
ALLERGIC/IMMUNOLOGIC NEGATIVE: 1

## 2022-06-13 NOTE — PROGRESS NOTES
of motion. Cervical back: Normal range of motion and neck supple. Skin:     General: Skin is warm and dry. Neurological:      General: No focal deficit present. Mental Status: She is alert and oriented to person, place, and time. Psychiatric:         Mood and Affect: Mood normal.           On this date 06/13/22  I have spent 30 minutes reviewing previous notes, test results and face to face with the patient discussing the diagnosis and importance of compliance with the treatment plan as well as documenting on the day of the visit. An electronic signature was used to authenticate this note.   -- Mathieu Alexander MD

## 2022-06-14 ENCOUNTER — TELEMEDICINE (OUTPATIENT)
Dept: ENDOCRINOLOGY | Age: 42
End: 2022-06-14
Payer: COMMERCIAL

## 2022-06-14 DIAGNOSIS — E11.65 TYPE 2 DIABETES MELLITUS WITH HYPERGLYCEMIA, WITHOUT LONG-TERM CURRENT USE OF INSULIN (HCC): ICD-10-CM

## 2022-06-14 DIAGNOSIS — E05.90 HYPERTHYROIDISM: Primary | ICD-10-CM

## 2022-06-14 PROCEDURE — 99214 OFFICE O/P EST MOD 30 MIN: CPT | Performed by: PHYSICIAN ASSISTANT

## 2022-06-14 PROCEDURE — 3044F HG A1C LEVEL LT 7.0%: CPT | Performed by: PHYSICIAN ASSISTANT

## 2022-06-14 RX ORDER — BLOOD SUGAR DIAGNOSTIC
STRIP MISCELLANEOUS
COMMUNITY
Start: 2022-03-08

## 2022-06-14 RX ORDER — BLOOD-GLUCOSE METER
EACH MISCELLANEOUS SEE ADMIN INSTRUCTIONS
COMMUNITY
Start: 2022-03-08

## 2022-06-14 RX ORDER — LANCETS 23 GAUGE
EACH MISCELLANEOUS
COMMUNITY
Start: 2022-03-08

## 2022-06-14 RX ORDER — SUMATRIPTAN 50 MG/1
TABLET, FILM COATED ORAL
COMMUNITY
Start: 2022-03-16

## 2022-06-14 ASSESSMENT — ENCOUNTER SYMPTOMS
COUGH: 1
TROUBLE SWALLOWING: 0

## 2022-06-14 NOTE — PROGRESS NOTES
[facility] ENDOCRINOLOGY   AND   THYROID NODULE CLINIC    Jeffery Martinez PA-C  [facility] Endocrinology and Thyroid Nodule Clinic  [address], [address]  [place]  Phone [phone]  Facsimile [phone]          Haja Arroyo is a 42 y.o. female seen 6/14/2022 for follow up evaluation of hyperthyroidism        Assessment and Plan:    Pursuant to the emergency declaration under the [facility], [facility] waiver authority and the [employer] and [employer] Act, this Virtual Visit was conducted, with patient's consent, to reduce the patient's risk of exposure to COVID-19 and provide continuity of care for an established patient. Services were provided through a video synchronous discussion virtually to substitute for in-person clinic visit. Total Time: minutes: 21-30 minutes. 1. Hyperthyroidism  Pt with historically poor control in context of poor compliance. No improvement despite significant dose increase. PCP has ordered repeat thyroid US due to recent cough. Update labs. Pt will likely require more definitive care, concern for eye disease, under care of Dr. Shira Vides  - Hepatic Function Panel; Future  - TSH; Future  - T3; Future  - T4, Free; Future    2. Type 2 diabetes mellitus with hyperglycemia, without long-term current use of insulin (HCC)  Remains stable on metformin monotherapy   - Basic Metabolic Panel; Future        Roberth Yanes was seen today for diabetes. Diagnoses and all orders for this visit:    Hyperthyroidism  -     Hepatic Function Panel; Future  -     TSH; Future  -     T3; Future  -     T4, Free; Future    Type 2 diabetes mellitus with hyperglycemia, without long-term current use of insulin (HCC)  -     Basic Metabolic Panel;  Future            History of Present Illness:    6/14/2022  VIRTUAL VISIT  Haja Arroyo is a 42 y.o. female who was seen by synchronous (real-time) audio-video technology on 6/14/2022 . The patient provided consent for this audio-video interaction. The [employer] platform was used. Patient was located at their home and provider in the office. Pt meets for follow up. Diabetes stable on metformin alone. Reports better compliance with methimazole, took it near daily last month except for the 4-5 days pt was out of town at [facility] for vacation. C/o feeling tired        Current regimen: metformin 500mg every day, methimazole 50mg once daily           History of Present Illness:      3/8/2022   VIRTUAL VISIT   Mauricio Vallejo is a [address] y.o.  female who was seen by synchronous (real-time) audio-video technology on 3/8/2022 . The  patient provided consent for this audio-video interaction. The [employer] platform was used. Patient was located at [facility] and provider at home. Patient with type 2 diabetes and hyperthyroidism in the context of Graves' disease meets for virtual follow-up explaining that she can only remember to take her medication once per day. She has been routinely taking 20 mg of methimazole but does not  remember call she should take a second dose and is usually missing half of her medication. She is taking Metformin only for her diabetes. She reports diarrhea, weight loss, and profound fatigue which are concerning. 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 female who was seen by synchronous (real-time) audio-video technology on 8/25/2021 .  The patient provided consent for this audio-video interaction.  The [employer] platform was used.  Patient and provider were located at their  individual homes.        Pt reports she is doing well, but \"i'm not good about taking it all\", it \"makes me tired\"       Is feeling well but is noncompliant with her methimazole and noncompliant with metformin.  Not monitoring blood sugar.  Most recent thyroid function test show overt hyperthyroidism              1/22/2021   VIRTUAL VISIT   Marjory Cooks Hatch is a 38 y.o. female who was seen by synchronous (real-time) audio-video technology on 1/22/2021 .  The patient provided consent for this audio-video interaction.  The [employer] platform was used.  Patient was located at  their home and provider in the office.        Patient meets for virtual follow-up of hypothyroidism now under the care of Dr. Isabelle Giles ophthalmology his assistance is appreciated.  Acknowledges that she is Graves' ophthalmology. Isabelle Giles believes this has nothing to do with her thyroid.  Patient  additionally has diabetes that she reports is well controlled.  She was previously on glipizide and I restarted Metformin.  When I started Metformin low-dose she discontinued glipizide. Celio Walker is checking her blood sugar and reports glucose in  the low 100s               DIABETES MELLITUS   Alycia Resendiz is here for follow up evaluation and treatment of worsening type 2 diabetes mellitus.         Review of Records: Patient with uncontrolled diabetes and possible subclinical hyperthyroidism referred for management           Date of diagnosis:    ~2015   Just prior to pregnancy   Metformin \"makes me drowsy\" and Diarrhea       Glipizide since sept 2020 after noncompliance with metformin due to SE               Denies HX pancreatitis, DKA, gastroparesis, diabetic foot ulcer           History obtained from patient        Home blood glucose monitoring frequency: 1-2 times per day    By recall     Typical Standard Deviation   Fasting 100s As low as 97- high 190's at time    [facility] lunch     AC supper     Bedtime 100s      Blood glucose levels are stable             Diet: high carb, trying to limit portions, baked regular soda, rare ETOH       Exercise:  activity with kids, walks at time     Notes increase in blood glucose with eating bread       Body weight trend: gaining per pt, losing weight per review of records                                            Wt Readings from Last 3 Encounters:        09/16/20  231 lb (104.8 kg)     07/22/20  241 lb 3.2 oz (109.4 kg)     06/18/20  246 lb (111.6 kg)             Pregnancy: depo shot       Diabetes education: The patient has received formal diabetes education.       Diabetic complications:                     Retinopathy: none                   Albuminuria/nephropathy:                           63/95/6042      QI 0.51, BGE 963                                  03/01/2022      Cr 0.40, , microalbumin/Cr ratio 8                          Neuropathy:  Numbness, intermittently feet           Hypoglycemia: never       Hyperglycemia: without symptoms        Hemoglobin A1c:                   08/24/2016      6.0%               10/03/2017      6.1%               06/18/2020      11.6%               09/16/2020      7.3%           01/26/2021      6.9%          09/29/2021      6.6%          02/08/2022      6.0%          03/01/2022      5.9%                 Other pertinent labs:                   Lipids:                            06/18/2020  TC- 123, LDL- 59, VLDL- 33,  HDL- 31, TG- 163                       Thyroid:            THYROID DYSFUNCTION   Alycia Resendiz is seen for follow up evaluation/management of hypo/hyperthyroidism; this was diagnosed in ~2015.         Review of Records: Patient reports a history of abnormal thyroid function but is unable to advise if hyperthyroid or hypothyroidism.   Recently on levothyroxine however chart reveals suppressed TSH and possible previous use of methimazole       Current symptoms:  She reports thyroid problems since pregnancy in 2015.         Reports hyper and hypothyroidism,        Reports \"scan in 2019\"       Levothyroxine 50mcg stopped on oct 2020           COMPLAINS of:Fatigue, Some diarrhea, low energy improved with B12, weight loss (25lbs over past 4 months) with decreased appetite, heat tolerating, left eye  proptisis stable, blurred vision improved with care of [facility]       On dep with irregular menses       DENIES: palpitations, tremors, excessive tearing, eye pain, anxiety               Prior treatment:    previously on levothyroxine, possibly on methimazole? ??           Current Regimen:   Methimazole 50mg daily (20mg BID)   Increased from 30mg in Dec 2020-- now pt reports she was sometimes missing a pill and taking 20mg                   Pertinent labs:    10/20/2020 TPO      TSI 31.70                 06/06/2016                           TSH                 <0.005                           Free T4           1.8                   10/03/2017                           TSH                 0.01                           KNIT T4           1.13                   12/12/2017                           TSH                 0.44                           CAEB T4           0.94                   06/18/2020                           TSH                 <0.006                              09/16/2020                           TSH                 <0.005 (PT WAS ON LEVOTHYROXINE 50MCG)                   12/11/2020                           TSH                 <0.005                           UVAY T4           2.30                           Total T3           2.30                       Methimazole increased from 30mg to 40mg 12/2020 (pt later discloses sometimes missing dose and getting 20mg daily)                               08/23/2021                           TSH                 <0.005 (non compliant with treatment)                           Free T4           5.22                           Total T3           429                             02/08/2022                          TSH                 <0.005                                           03/01/2022                          TSH                 <0.005 Free T4           6.44                          Total T3           567     04/13/2022    TSH  <0.005    Free T4 2.59    Total T3 330     05/17/2022    TSH  <0.005    Free T4 2.77    Total T3 346                      Allergies & Medications:  Reviewed in chart. Review of Systems   Constitutional: Positive for fatigue. HENT: Negative for trouble swallowing. Respiratory: Positive for cough. Noise when laying down       Vital Signs: There were no vitals taken for this visit. Return in about 16 weeks (around 10/4/2022). Portions of this note were generated with the assistance of voice recogniton software. As such, some errors in transcription may be present.

## 2022-06-27 ENCOUNTER — HOSPITAL ENCOUNTER (OUTPATIENT)
Dept: ULTRASOUND IMAGING | Age: 42
Discharge: HOME | End: 2022-06-30
Payer: COMMERCIAL

## 2022-06-27 DIAGNOSIS — E05.90 HYPERTHYROIDISM: ICD-10-CM

## 2022-06-27 DIAGNOSIS — R06.1 STRIDOR: ICD-10-CM

## 2022-06-27 PROCEDURE — 76536 US EXAM OF HEAD AND NECK: CPT

## 2022-06-29 DIAGNOSIS — R06.1 STRIDOR: Primary | ICD-10-CM

## 2022-08-09 ENCOUNTER — OFFICE VISIT (OUTPATIENT)
Dept: ENT CLINIC | Age: 42
End: 2022-08-09
Payer: COMMERCIAL

## 2022-08-09 VITALS
HEIGHT: 66 IN | WEIGHT: 233.2 LBS | BODY MASS INDEX: 37.48 KG/M2 | DIASTOLIC BLOOD PRESSURE: 82 MMHG | SYSTOLIC BLOOD PRESSURE: 140 MMHG

## 2022-08-09 DIAGNOSIS — E05.00 GRAVES DISEASE: ICD-10-CM

## 2022-08-09 DIAGNOSIS — J35.2 ADENOID HYPERPLASIA: ICD-10-CM

## 2022-08-09 DIAGNOSIS — K21.9 LARYNGOPHARYNGEAL REFLUX (LPR): ICD-10-CM

## 2022-08-09 DIAGNOSIS — J34.3 HYPERTROPHY OF BOTH INFERIOR NASAL TURBINATES: ICD-10-CM

## 2022-08-09 DIAGNOSIS — J34.89 NASAL OBSTRUCTION: ICD-10-CM

## 2022-08-09 DIAGNOSIS — J30.9 ALLERGIC RHINITIS, UNSPECIFIED SEASONALITY, UNSPECIFIED TRIGGER: Primary | ICD-10-CM

## 2022-08-09 PROCEDURE — 99204 OFFICE O/P NEW MOD 45 MIN: CPT | Performed by: STUDENT IN AN ORGANIZED HEALTH CARE EDUCATION/TRAINING PROGRAM

## 2022-08-09 PROCEDURE — 31231 NASAL ENDOSCOPY DX: CPT | Performed by: STUDENT IN AN ORGANIZED HEALTH CARE EDUCATION/TRAINING PROGRAM

## 2022-08-09 RX ORDER — FLUTICASONE PROPIONATE 50 MCG
1 SPRAY, SUSPENSION (ML) NASAL DAILY
Qty: 2 EACH | Refills: 5 | Status: SHIPPED | OUTPATIENT
Start: 2022-08-09

## 2022-08-09 RX ORDER — OMEPRAZOLE 40 MG/1
40 CAPSULE, DELAYED RELEASE ORAL
Qty: 90 CAPSULE | Refills: 5 | Status: SHIPPED | OUTPATIENT
Start: 2022-08-09

## 2022-08-09 RX ORDER — ATROPINE SULFATE 10 MG/ML
SOLUTION/ DROPS OPHTHALMIC
COMMUNITY
Start: 2022-07-29

## 2022-08-09 ASSESSMENT — ENCOUNTER SYMPTOMS
ABDOMINAL PAIN: 0
EYE DISCHARGE: 0
COUGH: 0

## 2022-08-09 NOTE — PROGRESS NOTES
[facility] Office Note    Patient: Xochilt Ward  MRN: [id number]  : 1980  Gender:  female  Vital Signs: BP (!) 140/82 (Site: Right Upper Arm, Position: Sitting)   Ht 5' 6\" (1.676 m)   Wt 233 lb 3.2 oz (105.8 kg)   BMI 37.64 kg/m²   Date: 2022    CC:   Chief Complaint   Patient presents with    New Patient    Other     Patient here for throat issues. She states that she constantly has to clear her throat. HPI:  Xochilt Ward is a 42 y.o. female who endorses nasal obstruction. She endorses stertorous breathing. She denies significant shortness of breath. She endorses thick mucus in her throat. She denies dysphagia or hoarseness. She has Graves' disease and associated ophthalmology. She takes methimazole. Past Medical History, Past Surgical History, Family history, Social History, and Medications were all reviewed with the patient today and updated as necessary. No Known Allergies  Patient Active Problem List   Diagnosis    History of cholecystectomy    Lung nodule    Hyperthyroidism    Smoker    History of chlamydia    Essential hypertension    Marijuana use    Type 2 diabetes mellitus with hyperglycemia, without long-term current use of insulin ([facility] [address]    Seropositive for herpes simplex 2 infection    Obesity, morbid ([facility] [address]     Current Outpatient Medications   Medication Sig    atropine 1 % ophthalmic solution ADMINISTER 1 DROP IN THE RIGHT EYE TWICE DAILY FOR 7 DAYS. fluticasone (FLONASE) 50 MCG/ACT nasal spray 1 spray by Each Nostril route in the morning.     omeprazole (PRILOSEC) 40 MG delayed release capsule Take 1 capsule by mouth every morning (before breakfast)    Blood Glucose Monitoring Suppl (ACCU-CHEK GUIDE) w/Device KIT by Other route See Admin Instructions    Lancets 28G MISC Check sugar twice daily    blood glucose test strips (ACCU-CHEK GUIDE) strip Check sugars twice daily, dx diabetes mellitus uncontrolled    SUMAtriptan (IMITREX) 50 MG tablet TAKE 1 TABLET BY MOUTH 1 TIME FOR UP TO 1 DOSE AS NEEDED FOR MIGRAINE    Lancets MISC Check sugar twice daily    acetaminophen (TYLENOL) 325 MG tablet Take 650 mg by mouth    diclofenac (VOLTAREN) 75 MG EC tablet Take 75 mg by mouth 2 times daily    ibuprofen (ADVIL;MOTRIN) 800 MG tablet Take 800 mg by mouth every 8 hours as needed    medroxyPROGESTERone (DEPO-PROVERA) 150 MG/ML injection ADMINISTER 1 ML IN THE MUSCLE 1 TIME FOR 1 DOSE    metFORMIN (GLUCOPHAGE-XR) 500 MG extended release tablet Take 500 mg by mouth Daily with supper    methIMAzole (TAPAZOLE) 10 MG tablet Take 50 mg by mouth daily    NIFEdipine (PROCARDIA XL) 30 MG extended release tablet Take 30 mg by mouth daily     No current facility-administered medications for this visit. Past Medical History:   Diagnosis Date    Abnormal Pap smear of cervix     Abnormal Papanicolaou smear of cervix     resolved    Anemia     Diabetes ([address]     metformin only- does not take BS at home- does not know last A1c- denies hypoglycemia    Essential hypertension     no current medications--since     Genital herpes     never had an outbreak    Gestational diabetes     History of chlamydia     tx    History of cholecystectomy 2016    Hypertension     CHTN not currently on med    Hyperthyroidism     was on Methimazole during last preg. not currently on anything. Marijuana use     twice daily per pt    Obesity (BMI 30-39.9) 2016    BMI 37.2    Smoker     5 cig daily since age 14    Ventricular septal defect (VSD) of fetus in villalobos pregnancy, antepartum 2017 at [facility]:  Appropriate fetal growth, reassuring fetal status; small suspected VSD seen. AC 55%, overall 64%, CHARLENE 17 cm, UA Dopplers WNL, BPP . 2017 [facility]: Peds cards echo today, see report from Dr Deo Mai; unable to see VSDs. Recommend  follow-up 2 weeks after delivery. 2017 at [facility]: Appropriate fetal growth without IUGR, reassuring fetal status.   AC 64, overall 49%, mucosal lesions. Oropharynx: clear with no erythema/exudate, no tonsillar hypertrophy. Ears: Normal appearing auricles, no hematomas. EACs clear with no cerumen impaction, healthy canal skin, TM's intact with no perforations or retraction pockets. No middle ear effusions. Neck: Soft, supple, no palpable lateral neck masses. No parotid or submandibular masses. No thyromegaly or palpable thyroid nodules. No surgical scars. Lymphatics: No palpable cervical LAD. Resp/Lungs: No audible stridor or wheezing, CTAB  Heart: RRR  Extremities: No clubbing or cyanosis. PROCEDURE: Nasal endoscopy  INDICATIONS: nasal obstruction  DESCRIPTION: After verbal consent was obtained and a timeout was performed, the 0 degree endoscope was advanced into both nares. The septum was midline w/ no perforations. Bilateral inferior turbinate hypertrophy. There were no masses seen along the nasal floor or within the nasopharynx. She does have adenoid hyperplasia involving the torus tubarius on the right. On the R side, the mucosa was healthy and the turbinates were intact. The middle meatus was clear w/ no edema, polyps or mucopurulence. On the L side, the mucosa was healthy and the turbinates were intact. The middle meatus was clear w/ no edema, polyps or mucopurulence. The sphenoethmoidal recess was examined bilaterally and was free of pus or polyposis. The scope was then advanced to examine the larynx which was normal.  The scope was then removed and the patient tolerated the procedure well w/ no complications. Thyroid ultrasound. CLINICAL INDICATION: Hyperthyroidism       PROCEDURE: Real-time grayscale sonographic evaluation of the thyroid gland with   color Doppler interrogation. COMPARISON: No prior       FINDINGS:       RIGHT THYROID LOBE: The right thyroid lobe measures 7.5 x 2.4 x 2.7 cm. The   echotexture is heterogeneous. No nodules identified. There is increased   vascularity.        LEFT THYROID LOBE: The left thyroid lobe measures 6.5 x 2.5 x 3.2 cm. The   echotexture is heterogeneous. No nodules identified. There is increased   vascularity. The isthmus measures 14 mm            Impression   Enlarged heterogeneous thyroid gland with increased vascularity   without discrete nodules. Graves' disease could be considered. CT Orbits / Sella w Contrast      Impression  Performed by [facility] PACS  Findings compatible with thyroid-associated orbitopathy, as described above. Signed by: 11/30/2020 12:00 PM: Frandy Marie    Narrative  [facility]    CT ORBITS WITH CONTRAST     INDICATION: Thyrotoxicosis and goiter. Graves' disease. Blurry vision. Daily pain in the left. COMPARISON: None. TECHNIQUE: Contiguous axial images of the orbits were obtained after the intravenous administration of 75 mL Omnipaque 350.  2-D images were generated and reviewed. FINDINGS:   . Low-density within the bilateral inferior rectus muscle bellies, which could reflect mucopolysaccharide/glycosaminoglycan deposition in the setting of thyroid associated orbitopathy. The inferior rectus muscle bellies appear mildly enlarged, especially on the left. The left medial rectus muscle may also be mildly enlarged. Bilateral proptosis, more pronounced on the left. .  Globes are intact. No retrobulbar hemorrhage or infiltration of the retrobulbar fat. Optic nerve sheath complexes and lacrimal glands are unremarkable. .  Superior ophthalmic veins are patent. Cavernous sinuses are symmetrical.     .  Scattered paranasal sinus mucosal thickening. .  No radiopaque foreign body. .  No evidence of fracture. Procedure Note    Frandy Marie MD - 11/30/2020   Formatting of this note might be different from the original.     CT ORBITS WITH CONTRAST     INDICATION: Thyrotoxicosis and goiter. Graves' disease. Blurry vision. Daily pain in the left. COMPARISON: None.      TECHNIQUE: Contiguous axial images of the orbits were obtained after the intravenous administration of 75 mL Omnipaque 350.  2-D images were generated and reviewed. FINDINGS:   . Low-density within the bilateral inferior rectus muscle bellies, which could reflect mucopolysaccharide/glycosaminoglycan deposition in the setting of thyroid associated orbitopathy. The inferior rectus muscle bellies appear mildly enlarged, especially on the left. The left medial rectus muscle may also be mildly enlarged. Bilateral proptosis, more pronounced on the left. .  Globes are intact. No retrobulbar hemorrhage or infiltration of the retrobulbar fat. Optic nerve sheath complexes and lacrimal glands are unremarkable. .  Superior ophthalmic veins are patent. Cavernous sinuses are symmetrical.     .  Scattered paranasal sinus mucosal thickening. .  No radiopaque foreign body. .  No evidence of fracture. IMPRESSION:   Findings compatible with thyroid-associated orbitopathy, as described above. ASSESSMENT and PLAN  79-year-old female with Graves' disease and associated ophthalmopathy. She takes methimazole. She has an endocrinologist.  I explained that thyroidectomy would be an option for her. She also has bilateral inferior turbinate hypertrophy, allergic rhinitis, LPR, and adenoid hyperplasia. I will give her Flonase and Prilosec to use. I will see her back in 3 months. ICD-10-CM    1. Allergic rhinitis, unspecified seasonality, unspecified trigger  J30.9       2. Nasal obstruction  J34.89 NASAL ENDOSCOPY,DX      3. Hypertrophy of both inferior nasal turbinates  J34.3       4. Adenoid hyperplasia  J35.2       5. Laryngopharyngeal reflux (LPR)  K21.9       6.  Graves disease  E05.00             Rosalina Hernandez MD  8/9/2022  Electronically signed

## 2022-10-17 ENCOUNTER — TELEMEDICINE (OUTPATIENT)
Dept: ENDOCRINOLOGY | Age: 42
End: 2022-10-17
Payer: COMMERCIAL

## 2022-10-17 DIAGNOSIS — I10 ESSENTIAL (PRIMARY) HYPERTENSION: ICD-10-CM

## 2022-10-17 DIAGNOSIS — E05.90 HYPERTHYROIDISM: Primary | ICD-10-CM

## 2022-10-17 DIAGNOSIS — E11.65 TYPE 2 DIABETES MELLITUS WITH HYPERGLYCEMIA, WITHOUT LONG-TERM CURRENT USE OF INSULIN (HCC): ICD-10-CM

## 2022-10-17 PROCEDURE — 3044F HG A1C LEVEL LT 7.0%: CPT | Performed by: PHYSICIAN ASSISTANT

## 2022-10-17 PROCEDURE — 99214 OFFICE O/P EST MOD 30 MIN: CPT | Performed by: PHYSICIAN ASSISTANT

## 2022-10-17 RX ORDER — METHIMAZOLE 10 MG/1
50 TABLET ORAL DAILY
Qty: 450 TABLET | Refills: 3 | Status: SHIPPED | OUTPATIENT
Start: 2022-10-17

## 2022-10-17 NOTE — PROGRESS NOTES
[facility] ENDOCRINOLOGY   AND   THYROID NODULE CLINIC    Becky Hood PA-C  [facility] Endocrinology and Thyroid Nodule Clinic  [address], [address]  [place], [place] Giorgi Alejandro  Phone [phone]  Facsimile [phone]          Andrews Chavez is a 42 y.o. female seen 10/17/2022 for follow up evaluation of grave's disease and type 2 diabetes        Assessment and Plan:    Pursuant to the emergency declaration under the [facility], [facility] waiver authority and the [employer] Act, this Virtual Visit was conducted, with patient's consent, to reduce the patient's risk of exposure to COVID-19 and provide continuity of care for an established patient. Services were provided through a video synchronous discussion virtually to substitute for in-person clinic visit. Total Time: minutes: 21-30 minutes. 1. Hyperthyroidism  Morbidly obese patient with hyperthyroidism on high-dose methimazole with poor follow-up and poor control. She acknowledges that she often misses her methimazole doses. We have never seen her euthyroid, she had recent evaluation by otolaryngology who suspects that her enlarged thyroid may be contributory to some of her respiratory issues. As patient does not have medically managed hyperthyroidism and may benefit from thyroidectomy recommend referral back to ear nose and throat for evaluation for possible definitive treatment of hyperthyroidism and surgical thyroidectomy. Patient is aware that she will require a daily levothyroxine supplement that must be taken appropriately for best results. - methIMAzole (TAPAZOLE) 10 MG tablet; Take 5 tablets by mouth daily  Dispense: 450 tablet; Refill: 3  - Ambreen Painter MD, Otolaryngology, [place]    2.  Type 2 diabetes mellitus with hyperglycemia, without long-term current use of insulin (HCC)  Update labs, only checking fasting, only taking once daily metformin with good tolerance    3. Essential (primary) hypertension  BP Readings from Last 3 Encounters:   08/09/22 (!) 140/82   06/13/22 132/80   04/13/22 110/70       Pt is acutely ill today with upper respiratory infection symptoms. Both of her children and their father are also sick with the same. As she does admit to some shortness of breath I have strongly encouraged her to seek evaluation and treatment with primary care, urgent care, ER.can call for EMS if unable to drive self. Patient verbalizes understanding. Diagnoses and all orders for this visit:    Hyperthyroidism  -     methIMAzole (TAPAZOLE) 10 MG tablet; Take 5 tablets by mouth daily  -     Francisco Javier Rutherford MD, Otolaryngology, [place]    Type 2 diabetes mellitus with hyperglycemia, without long-term current use of insulin ([facility] [address]    Essential (primary) hypertension          History of Present Illness:    10/17/2022  VIRTUAL VISIT  James Genao is a 42 y.o. female who was seen by synchronous (real-time) audio-video technology on 10/17/2022 . The patient provided consent for this audio-video interaction. The [employer] platform was used. Patient was located at their home and provider in the office. Needs for virtual follow-up. Patient has not had labs drawn. Patient reports acute upper respiratory illness with some shortness of breath and fever. She has body aches as well. There are other members of her household including children that are ill and her children's father also with symptoms. Patient has not yet been evaluated or treated. She does report unilateral eye erythema and blurred vision since last week as she is not yet had evaluated.   Records show that she was seen by otolaryngology who acknowledge that her enlarged thyroid may be contributory to some of her other symptoms        Current regimen: metformin 500mg every day, methimazole 50mg once daily        6/14/2022  VIRTUAL VISIT  James Genao is a 42 y.o. female who was seen by synchronous (real-time) audio-video technology on 6/14/2022 . The patient provided consent for this audio-video interaction. The [employer] platform was used. Patient was located at their home and provider in the office. Pt meets for follow up. Diabetes stable on metformin alone. Reports better compliance with methimazole, took it near daily last month except for the 4-5 days pt was out of town at [facility] for vacation. C/o feeling tired         Current regimen: metformin 500mg every day, methimazole 50mg once daily           3/8/2022   VIRTUAL VISIT   Timmie Cranker is a 42 y.o.  female who was seen by synchronous (real-time) audio-video technology on 3/8/2022 . The  patient provided consent for this audio-video interaction. The [employer] platform was used. Patient was located at [employer] and provider at home. Patient with type 2 diabetes and hyperthyroidism in the context of Graves' disease meets for virtual follow-up explaining that she can only remember to take her medication once per day. She has been routinely taking 20 mg of methimazole but does not  remember call she should take a second dose and is usually missing half of her medication. She is taking Metformin only for her diabetes. She reports diarrhea, weight loss, and profound fatigue which are concerning. 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 female who was seen by synchronous (real-time) audio-video technology on 8/25/2021 . The patient provided consent for this audio-video interaction. The [employer] platform was used. Patient and provider were located at their  individual homes.         Pt reports she is doing well, but \"i'm not good about taking it all\", it \"makes me tired\"       Is feeling well but is noncompliant with her methimazole and noncompliant with metformin. Not monitoring blood sugar. Most recent thyroid function test show overt hyperthyroidism              1/22/2021   VIRTUAL VISIT   Miriam Reese is a 38 y.o. female who was seen by synchronous (real-time) audio-video technology on 1/22/2021 . The patient provided consent for this audio-video interaction. The [employer] platform was used. Patient was located at  their home and provider in the office. Patient meets for virtual follow-up of hypothyroidism now under the care of Dr. Flaco Goyal ophthalmology his assistance is appreciated. Acknowledges that she is Graves' ophthalmology. Patient believes this has nothing to do with her thyroid. Patient  additionally has diabetes that she reports is well controlled. She was previously on glipizide and I restarted Metformin. When I started Metformin low-dose she discontinued glipizide. She is checking her blood sugar and reports glucose in  the low 100s               DIABETES MELLITUS   Miriam Reese is here for follow up evaluation and treatment of worsening type 2 diabetes mellitus.          Review of Records: Patient with uncontrolled diabetes and possible subclinical hyperthyroidism referred for management           Date of diagnosis:    ~2015   Just prior to pregnancy   Metformin \"makes me drowsy\" and Diarrhea       Glipizide since sept 2020 after noncompliance with metformin due to SE       Denies HX pancreatitis, DKA, gastroparesis, diabetic foot ulcer       History obtained from patient      Home blood glucose monitoring frequency: <1 times per day    By recall     Typical    Fasting Low 100    AC lunch     AC supper     Bedtime         Blood glucose levels are stable             Diet: high carb, trying to limit portions, baked regular soda, rare ETOH       Exercise:  activity with kids, walks at time       Notes increase in blood glucose with eating bread       Body weight trend: gaining per pt                 Wt Readings from Last 3 Encounters:   08/09/22 233 lb 3.2 oz (105.8 kg)   06/13/22 218 lb (98.9 kg)   04/13/22 201 lb 3.2 oz (91.3 kg)                                  Wt Readings from Last 3 Encounters:        09/16/20  231 lb (104.8 kg)     07/22/20  241 lb 3.2 oz (109.4 kg)     06/18/20  246 lb (111.6 kg)             Pregnancy: depo shot       Diabetes education: The patient has received formal diabetes education. Diabetic complications:                     Retinopathy: none                   Albuminuria/nephropathy:                           09/16/2020      Cr 0.51,                                   03/01/2022      Cr 0.40, , microalbumin/Cr ratio 8                      Neuropathy:  Numbness, intermittently feet           Hypoglycemia: never       Hyperglycemia: without symptoms        Hemoglobin A1c:                   08/24/2016      6.0%               10/03/2017      6.1%               06/18/2020      11.6%               09/16/2020      7.3%           01/26/2021      6.9%          09/29/2021      6.6%          02/08/2022      6.0%          03/01/2022      5.9%                  Other pertinent labs:                   Lipids:                            06/18/2020  TC- 123, LDL- 59, VLDL- 33,  HDL- 31, TG- 163                       Thyroid:            THYROID DYSFUNCTION   Lenny Dais is seen for follow up evaluation/management of hypo/hyperthyroidism; this was diagnosed in ~2015. Review of Records: Patient reports a history of abnormal thyroid function but is unable to advise if hyperthyroid or hypothyroidism. Recently on levothyroxine however chart reveals suppressed TSH and possible previous use of methimazole       Current symptoms:  She reports thyroid problems since pregnancy in 2015.          Reports hyper and hypothyroidism,        Reports \"scan in 2019\"       Levothyroxine 50mcg stopped on oct 2020           COMPLAINS of:Fatigue, Some diarrhea, low energy improved with B12, weight loss (25lbs over past 4 months) with decreased appetite, heat tolerating, left eye  proptisis stable, blurred vision improved with care of [facility]       On dep with irregular menses       DENIES: palpitations, tremors, excessive tearing, eye pain, anxiety               Prior treatment:    previously on levothyroxine, possibly on methimazole         Current Regimen:   Methimazole 50mg daily              Pertinent labs:     10/20/2020          TPO                               TSI 31.70                  06/06/2016                           TSH                 <0.005                           Free T4           1.8                   10/03/2017                           TSH                 0.01                           Free T4           1.13                   12/12/2017                           TSH                 0.44                           Free T4           0.94                   06/18/2020                           TSH                 <0.006                              09/16/2020                           TSH                 <0.005 (PT WAS ON LEVOTHYROXINE 50MCG)                   12/11/2020                           TSH                 <0.005                           Free T4           2.30                           Total T3           2.30                       Methimazole increased from 30mg to 40mg 12/2020 (pt later discloses sometimes missing dose and getting 20mg daily)                               08/23/2021                           TSH                 <0.005 (non compliant with treatment)                           Free T4           5.22                           Total T3           429                             02/08/2022                          TSH                 <0.005                                           03/01/2022                          TSH                 <0.005                          Free T4           6.44                          Total T3           567 04/13/2022                          TSH                 <0.005                          Free T4           2.59                          Total T3           330                 05/17/2022                          TSH                 <0.005                          Free T4           2.77                          Total T3           346           Allergies & Medications:  Reviewed in chart. Review of Systems    Vital Signs: There were no vitals taken for this visit. Return in about 3 months (around 1/17/2023), or can you schedule soon labs downtown? ? end of next week, for Diabetes DM2 Follow-Up, hyperthyroidism . Portions of this note were generated with the assistance of voice recogniton software. As such, some errors in transcription may be present.

## 2022-12-08 ENCOUNTER — OFFICE VISIT (OUTPATIENT)
Dept: ENT CLINIC | Age: 42
End: 2022-12-08
Payer: COMMERCIAL

## 2022-12-08 ENCOUNTER — PREP FOR PROCEDURE (OUTPATIENT)
Dept: ENT CLINIC | Age: 42
End: 2022-12-08

## 2022-12-08 VITALS
DIASTOLIC BLOOD PRESSURE: 78 MMHG | BODY MASS INDEX: 37.35 KG/M2 | SYSTOLIC BLOOD PRESSURE: 150 MMHG | WEIGHT: 232.4 LBS | HEIGHT: 66 IN

## 2022-12-08 DIAGNOSIS — E05.00 GRAVES DISEASE: Primary | ICD-10-CM

## 2022-12-08 DIAGNOSIS — J30.9 ALLERGIC RHINITIS, UNSPECIFIED SEASONALITY, UNSPECIFIED TRIGGER: ICD-10-CM

## 2022-12-08 DIAGNOSIS — K21.9 LARYNGOPHARYNGEAL REFLUX (LPR): ICD-10-CM

## 2022-12-08 DIAGNOSIS — E04.9 GOITER: ICD-10-CM

## 2022-12-08 PROBLEM — E04.2 MULTINODULAR GOITER: Status: ACTIVE | Noted: 2022-12-08

## 2022-12-08 PROCEDURE — 3074F SYST BP LT 130 MM HG: CPT | Performed by: STUDENT IN AN ORGANIZED HEALTH CARE EDUCATION/TRAINING PROGRAM

## 2022-12-08 PROCEDURE — 99214 OFFICE O/P EST MOD 30 MIN: CPT | Performed by: STUDENT IN AN ORGANIZED HEALTH CARE EDUCATION/TRAINING PROGRAM

## 2022-12-08 PROCEDURE — 3078F DIAST BP <80 MM HG: CPT | Performed by: STUDENT IN AN ORGANIZED HEALTH CARE EDUCATION/TRAINING PROGRAM

## 2022-12-08 ASSESSMENT — ENCOUNTER SYMPTOMS
EYE DISCHARGE: 0
COUGH: 0
ABDOMINAL PAIN: 0

## 2022-12-08 NOTE — PROGRESS NOTES
[place] [facility] Office Note    Patient: Melonie Cantu  MRN: [id number]  : 1980  Gender:  female  Vital Signs: BP (!) 150/78 (Site: Left Upper Arm, Position: Sitting)   Ht 5' 6\" (1.676 m)   Wt 232 lb 6.4 oz (105.4 kg)   BMI 37.51 kg/m²   Date: 2022    CC:   Chief Complaint   Patient presents with    Other     Patient here for throat issues. She states she has a referral for thyroid also. HPI:  Melonie Cantu is a 42 y.o. female with a history of LPR, allergic rhinitis, Graves' disease, and multinodular goiter. She feels like omeprazole has helped her reflux. She still has occasional mucus in her throat in the morning and globus sensation but this is improved. She uses Flonase and denies any significant nasal obstruction. She has Graves' disease and takes methimazole. She endorses having trouble regulating her thyroid hormone levels. She also has a goiter and endorses neck fullness and occasional shortness of breath. Past Medical History, Past Surgical History, Family history, Social History, and Medications were all reviewed with the patient today and updated as necessary. No Known Allergies  Patient Active Problem List   Diagnosis    History of cholecystectomy    Lung nodule    Hyperthyroidism    Smoker    History of chlamydia    Essential hypertension    Marijuana use    Type 2 diabetes mellitus with hyperglycemia, without long-term current use of insulin ([facility] [address]    Seropositive for herpes simplex 2 infection    Obesity, morbid (HCC)     Current Outpatient Medications   Medication Sig    methIMAzole (TAPAZOLE) 10 MG tablet Take 5 tablets by mouth daily    atropine 1 % ophthalmic solution ADMINISTER 1 DROP IN THE RIGHT EYE TWICE DAILY FOR 7 DAYS. fluticasone (FLONASE) 50 MCG/ACT nasal spray 1 spray by Each Nostril route in the morning.     omeprazole (PRILOSEC) 40 MG delayed release capsule Take 1 capsule by mouth every morning (before breakfast)    Blood Glucose Monitoring Suppl (ACCU-CHEK GUIDE) w/Device KIT by Other route See Admin Instructions    Lancets 28G MISC Check sugar twice daily    blood glucose test strips (ACCU-CHEK GUIDE) strip Check sugars twice daily, dx diabetes mellitus uncontrolled    SUMAtriptan (IMITREX) 50 MG tablet TAKE 1 TABLET BY MOUTH 1 TIME FOR UP TO 1 DOSE AS NEEDED FOR MIGRAINE    Lancets MISC Check sugar twice daily    acetaminophen (TYLENOL) 325 MG tablet Take 650 mg by mouth    diclofenac (VOLTAREN) 75 MG EC tablet Take 75 mg by mouth 2 times daily    ibuprofen (ADVIL;MOTRIN) 800 MG tablet Take 800 mg by mouth every 8 hours as needed    medroxyPROGESTERone (DEPO-PROVERA) 150 MG/ML injection ADMINISTER 1 ML IN THE MUSCLE 1 TIME FOR 1 DOSE    metFORMIN (GLUCOPHAGE-XR) 500 MG extended release tablet Take 500 mg by mouth Daily with supper    NIFEdipine (PROCARDIA XL) 30 MG extended release tablet Take 30 mg by mouth daily     No current facility-administered medications for this visit. Past Medical History:   Diagnosis Date    Abnormal Pap smear of cervix 2022    Abnormal Papanicolaou smear of cervix     resolved    Anemia     Diabetes ([facility] [address] 2015    metformin only- does not take BS at home- does not know last A1c- denies hypoglycemia    Essential hypertension     no current medications--since 2015    Genital herpes     never had an outbreak    Gestational diabetes     History of chlamydia     tx    History of cholecystectomy 06/25/2016    Hypertension     CHTN not currently on med    Hyperthyroidism     was on Methimazole during last preg. not currently on anything. Marijuana use     twice daily per pt    Obesity (BMI 30-39.9) 09/28/2016    BMI 37.2    Smoker     5 cig daily since age 14    Ventricular septal defect (VSD) of fetus in villalobos pregnancy, antepartum 11/9/2017 11/9/2017 at [facility]:  Appropriate fetal growth, reassuring fetal status; small suspected VSD seen.   AC 55%, overall 64%, CHARLENE 17 cm, UA Dopplers WNL, BPP 8/8. 12/7/2017 [facility]: Peds cards echo today, see report from Dr Kedar Cooper; unable to see VSDs. Recommend  follow-up 2 weeks after delivery. 2017 at [facility]: Appropriate fetal growth without IUGR, reassuring fetal status. AC 64, overall 49%,     Social History     Tobacco Use    Smoking status: Every Day     Packs/day: 0.25     Types: Cigarettes     Start date: 1994    Smokeless tobacco: Never    Tobacco comments:     Quit smokin-5 cigarettes daily   Substance Use Topics    Alcohol use: Yes     Past Surgical History:   Procedure Laterality Date     SECTION  2019    CHOLECYSTECTOMY, LAPAROSCOPIC      COLONOSCOPY  2022    GI      EGD    GYN       Family History   Problem Relation Age of Onset    Cancer Mother         cervical    Diabetes Mother     Stroke Mother     Cancer Maternal Grandmother         breast ca    Diabetes Maternal Grandmother     Heart Disease Maternal Grandmother     No Known Problems Brother         ROS:    Review of Systems   Constitutional:  Negative for fever. HENT:  Negative for ear discharge and ear pain. Eyes:  Negative for discharge. Respiratory:  Negative for cough. Cardiovascular:  Negative for chest pain. Gastrointestinal:  Negative for abdominal pain. Genitourinary:  Negative for difficulty urinating. Musculoskeletal:  Negative for neck pain. Skin:  Negative for rash. Allergic/Immunologic: Negative for environmental allergies. Neurological:  Negative for dizziness. Hematological:  Negative for adenopathy. Psychiatric/Behavioral:  Negative for agitation. PHYSICAL EXAM:    BP (!) 150/78 (Site: Left Upper Arm, Position: Sitting)   Ht 5' 6\" (1.676 m)   Wt 232 lb 6.4 oz (105.4 kg)   BMI 37.51 kg/m²     General: NAD, well-appearing  Neuro: No gross neuro deficits. CN's II-XII intact. No facial weakness. Eyes: EOMI. Pupils reactive. No periorbital edema/ecchymosis. Skin: No facial erythema, rashes or concerning lesions.   Nose: No external deviations or saddling. Intranasally, septum is midline without perforations, nasal mucosa appears healthy with no erythema, mucopurulence, or polyps. Mouth: Moist mucus membranes, normal tongue/palate mobility, no concerning mucosal lesions. Oropharynx: clear with no erythema/exudate, no tonsillar hypertrophy. Ears: Normal appearing auricles, no hematomas. EACs clear with no cerumen impaction, healthy canal skin, TM's intact with no perforations or retraction pockets. No middle ear effusions. Neck: Soft, supple, no palpable lateral neck masses. No parotid or submandibular masses. Multinodular goiter. No surgical scars. Lymphatics: No palpable cervical LAD. Resp/Lungs: No audible stridor or wheezing, CTAB  Heart: RRR  Extremities: No clubbing or cyanosis. Thyroid ultrasound. CLINICAL INDICATION: Hyperthyroidism       PROCEDURE: Real-time grayscale sonographic evaluation of the thyroid gland with   color Doppler interrogation. COMPARISON: No prior       FINDINGS:       RIGHT THYROID LOBE: The right thyroid lobe measures 7.5 x 2.4 x 2.7 cm. The   echotexture is heterogeneous. No nodules identified. There is increased   vascularity. LEFT THYROID LOBE:  The left thyroid lobe measures 6.5 x 2.5 x 3.2 cm. The   echotexture is heterogeneous. No nodules identified. There is increased   vascularity. The isthmus measures 14 mm            Impression   Enlarged heterogeneous thyroid gland with increased vascularity   without discrete nodules. Graves' disease could be considered.    08/23/2021                           TSH                 <0.005 (non compliant with treatment)                           Free T4           5.22                           Total T3           429                             02/08/2022                          TSH                 <0.005                                           03/01/2022                          TSH                 <0.005                          Free T4 6.44                          Total T3           567                 04/13/2022                          TSH                 <0.005                          Free T4           2.59                          Total T3           330                 05/17/2022                          TSH                 <0.005                          Free T4           2.77                          Total T3           346  ASSESSMENT and PLAN  36-year old female with LPR, allergic rhinitis, multinodular goiter, Graves' disease. LPR well-controlled with omeprazole and allergic rhinitis controlled with Flonase. I explained options of treatment for Graves' disease which include continued treatment with methimazole, radioactive iodine ablation, and total thyroidectomy. I discussed the risks of thyroid surgery including bleeding/hematoma, damage to recurrent laryngeal nerve w/ subsequent hoarseness or stridor, possible need for tracheostomy, low calcium, need for lifetime thyroid supplementation, recurrence, and need for further procedures, and they would like to proceed. ICD-10-CM    1. Graves disease  E05.00       2. Laryngopharyngeal reflux (LPR)  K21.9       3. Goiter  E04.9       4. Allergic rhinitis, unspecified seasonality, unspecified trigger  J30.9             Chayo Bro MD  12/8/2022  Electronically signed    Note dictated using voice recognition software. Please excuse any typos.

## 2023-01-17 ENCOUNTER — PREP FOR PROCEDURE (OUTPATIENT)
Dept: ENT CLINIC | Age: 43
End: 2023-01-17

## 2023-01-17 DIAGNOSIS — E04.9 GOITER: Primary | ICD-10-CM

## 2023-01-20 ENCOUNTER — OFFICE VISIT (OUTPATIENT)
Dept: FAMILY MEDICINE CLINIC | Facility: CLINIC | Age: 43
End: 2023-01-20

## 2023-01-20 VITALS
TEMPERATURE: 97.6 F | OXYGEN SATURATION: 97 % | HEART RATE: 71 BPM | BODY MASS INDEX: 36.32 KG/M2 | DIASTOLIC BLOOD PRESSURE: 66 MMHG | WEIGHT: 226 LBS | HEIGHT: 66 IN | RESPIRATION RATE: 18 BRPM | SYSTOLIC BLOOD PRESSURE: 128 MMHG

## 2023-01-20 DIAGNOSIS — M70.62 GREATER TROCHANTERIC BURSITIS, LEFT: Primary | ICD-10-CM

## 2023-01-20 RX ORDER — KETOROLAC TROMETHAMINE 30 MG/ML
60 INJECTION, SOLUTION INTRAMUSCULAR; INTRAVENOUS
Status: COMPLETED | OUTPATIENT
Start: 2023-01-20 | End: 2023-01-20

## 2023-01-20 RX ORDER — IBUPROFEN 800 MG/1
800 TABLET ORAL
Qty: 270 TABLET | Refills: 1 | Status: SHIPPED | OUTPATIENT
Start: 2023-01-20

## 2023-01-20 RX ORDER — METHYLPREDNISOLONE 4 MG/1
TABLET ORAL
Qty: 1 KIT | Refills: 0 | Status: SHIPPED | OUTPATIENT
Start: 2023-01-20 | End: 2023-01-26

## 2023-01-20 RX ADMIN — KETOROLAC TROMETHAMINE 60 MG: 30 INJECTION, SOLUTION INTRAMUSCULAR; INTRAVENOUS at 10:45

## 2023-01-20 ASSESSMENT — ENCOUNTER SYMPTOMS
CONSTIPATION: 0
SHORTNESS OF BREATH: 0
SORE THROAT: 0
ABDOMINAL PAIN: 0
VOMITING: 0
NAUSEA: 0
SINUS PAIN: 0
COUGH: 0
WHEEZING: 0
BACK PAIN: 0
DIARRHEA: 0
EYE PAIN: 0

## 2023-01-20 NOTE — LETTER
[address] [facility] [address]  Phone: [phone]  Fax: [phone]    MEHUL Serrano - [facility]        January 20, 2023     Patient: Mauricio Vallejo   YOB: 1980   Date of Visit: 1/20/2023       To Whom It May Concern: It is my medical opinion that Mauricio Vallejo may return to work on 1/23/23. If you have any questions or concerns, please don't hesitate to call.     Sincerely,        MEHUL Serrano - CNP

## 2023-01-20 NOTE — PROGRESS NOTES
Maribell Khan (:  1980) is a 36 y.o. female,Established patient, here for evaluation of the following chief complaint(s):  Joint Pain (Started like a cramp three days ago. Last night pain gotten worse. //Medication: pt took ibuprofen 800 mg)         ASSESSMENT/PLAN:  1. Greater trochanteric bursitis, left  -     ketorolac (TORADOL) injection 60 mg; 60 mg, IntraMUSCular, ONCE PRN, 1 dose, Starting on Fri 23 at 1043, Until Sat 23 at 1043, Pain Severe (7-10)Do not administer for more than 5 days. -     methylPREDNISolone (MEDROL DOSEPACK) 4 MG tablet; Take by mouth., Disp-1 kit, R-0Normal  -     ibuprofen (ADVIL;MOTRIN) 800 MG tablet; Take 1 tablet by mouth 3 times daily (with meals), Disp-270 tablet, R-1Normal  -     [facility] - Physical Therapy, [employer] [facility]    [place]. Differentials: bursitis, sciatica  - No red flags on exam today, I do not see where any imaging is needed at this time. - Patient given Toradol injection in clinic today, advised to wait 6 hours for next NSAID dose. - Referral placed for physical therapy. Advised to expect a call from them. - Counseled patient on conservative treatments with RICE, Ibuprofen 600 - 800 mg (with food) every 6-8 hours to decrease inflammation and pain and Tylenol 1,000 mg TID to decrease pain massage and gentle stretching/exercises. Advised to also take PPI if taking NSAID daily.    - Medrol short-course prescribed, side effects, risks and benefits reviewed. - The patient was given in depth verbal and written instructions regarding specific red flag symptoms for which to report to the ED in the meantime. Patient verbalizes understanding and agreement with all teaching and treatment plans.    - F/u  if no improvement or worsening symptoms prn, advised to expect gradual improvement over the next month. Subjective   SUBJECTIVE/OBJECTIVE:  Reports bad cramping in her left hip that started three days ago.  Reports the cramps would resolve spontaneously but last night it progressed into a tight pain. She took some 800 mg ibuprofen last night and was able to get some sleep. She has a hard time describing the pain, states it just hurts. Review of Systems   Constitutional:  Negative for appetite change, fatigue, fever and unexpected weight change. HENT:  Negative for congestion, ear pain, postnasal drip, sinus pain and sore throat. Eyes:  Negative for pain. Respiratory:  Negative for cough, shortness of breath and wheezing. Cardiovascular:  Negative for palpitations and leg swelling. Gastrointestinal:  Negative for abdominal pain, constipation, diarrhea, nausea and vomiting. Genitourinary:  Negative for dysuria, frequency and urgency. Musculoskeletal:  Positive for arthralgias and gait problem. Negative for back pain and joint swelling. Skin:  Negative for rash and wound. Neurological:  Negative for dizziness, weakness and headaches. Hematological:  Does not bruise/bleed easily. Objective   Physical Exam  Vitals reviewed. Constitutional:       Appearance: Normal appearance. HENT:      Head: Normocephalic and atraumatic. Eyes:      Extraocular Movements: Extraocular movements intact. Pupils: Pupils are equal, round, and reactive to light. Cardiovascular:      Rate and Rhythm: Normal rate and regular rhythm. Heart sounds: Normal heart sounds. Pulmonary:      Effort: Pulmonary effort is normal.      Breath sounds: Normal breath sounds. Abdominal:      General: Abdomen is flat. Musculoskeletal:      Right hip: Normal.      Left hip: Tenderness present. Skin:     General: Skin is warm and dry. Neurological:      General: No focal deficit present. Mental Status: She is alert and oriented to person, place, and time. An electronic signature was used to authenticate this note.     --MEHUL Barraza - CNP

## 2023-02-02 ENCOUNTER — HOSPITAL ENCOUNTER (OUTPATIENT)
Dept: PHYSICAL THERAPY | Age: 43
Setting detail: RECURRING SERIES
Discharge: HOME | End: 2023-02-05
Payer: COMMERCIAL

## 2023-02-02 PROCEDURE — 97161 PT EVAL LOW COMPLEX 20 MIN: CPT

## 2023-02-02 NOTE — PROGRESS NOTES
Tam Frausto  : 1980  Primary: [address] (Medicaid Managed)  Secondary:  [address] @ [address] [place] [address] Flavia  [address]  [address]  Phone: [phone]  Fax: [phone] Plan Frequency: 2x week  Plan of Care/Certification Expiration Date: 23    PT Visit Info:  Plan Frequency: 2x week  Plan of Care/Certification Expiration Date: 23  Total # of Visits to Date: 1  Progress Note Counter: 1    Visit Count:  1    OUTPATIENT PHYSICAL THERAPY:OP NOTE TYPE: Treatment Note 2023       Episode  }Appt Desk             Treatment Diagnosis:    Pain in left hip (M25.552)  Stiffness of Left Hip, Not elsewhere classified (M25.652)  Difficulty in walking, Not elsewhere classified (R26.2)  Other abnormalities of gait and mobility (R26.89)  Generalized Muscle Weakness (M62.81)  Other lack of cordination (R27.8)  Medical/Referring Diagnosis:  Greater trochanteric bursitis, left [M70.62]  Referring Physician:  Ailze Sotelo, APRN - CNP  MD Orders:  PT Eval and Treat   Date of Onset:  No data recorded   Allergies:   Patient has no known allergies. Restrictions/Precautions:  No data recorded  No data recorded   Interventions Planned (Treatment may consist of any combination of the following):    Current Treatment Recommendations: Strengthening; ROM; Balance training; Functional mobility training; Endurance training; Gait training; Stair training; Neuromuscular re-education; Manual; Return to work related activity; Home exercise program; Modalities; Dry needling; Aquatics; Therapeutic activities;  Vestibular rehab     Subjective Comments: reports L hip pain     Initial:}    3 /10Post Session:      3  /10  Medications Last Reviewed:  2023  Updated Objective Findings:  See evaluation note from today  Treatment   THERAPEUTIC EXERCISE: (0 minutes):       Date:    EVAL ONLY Date:   Date:     Activity/Exercise Parameters Parameters Parameters   Education HEP, POC, hip anatomy                                           MANUAL THERAPY: (0 minutes):     Treatment/Session Summary:    Treatment Assessment:    See accompanying eval   Communication/Consultation:  None today  Equipment provided today:  None  Recommendations/Intent for next treatment session: Next visit will focus on progressive strenthening.     Total Treatment Billable Duration:  untimed eval only  Time In: 1015  Time Out: Raul Angelina 835 , PT       Charge Capture  }Post Session Pain  PT Visit Info  MedBridge Portal  MD Guidelines  Scanned Media  Benefits  MyChart    Future Appointments   Date Time Provider Desi Zaragoza   3/3/2023  1:30 PM Margarita Abdul PA-C END [facility]   3/9/2023  9:45 AM Ella Spatz, MD E[facility]

## 2023-02-02 NOTE — THERAPY EVALUATION
Tara Biswas  : 1980  Primary: [address] (Medicaid Managed)  Secondary:  [address] @ [address] [address]  Phone: [phone]  Fax: [phone] Plan Frequency: 2x week  Plan of Care/Certification Expiration Date: 23    PT Visit Info:  Plan Frequency: 2x week  Plan of Care/Certification Expiration Date: 23  Total # of Visits to Date: 1  Progress Note Counter: 1    Visit Count:  1                OUTPATIENT PHYSICAL THERAPY:             OP NOTE TYPE: Initial Assessment 2023               Episode (L hip pain) Appt Desk         Treatment Diagnosis:  Pain in left hip (M25.552)  Stiffness of Left Hip, Not elsewhere classified (M25.652)  Difficulty in walking, Not elsewhere classified (R26.2)  Other abnormalities of gait and mobility (R26.89)  Generalized Muscle Weakness (M62.81)  Other lack of cordination (R27.8)  Medical/Referring Diagnosis:  Greater trochanteric bursitis, left [M70.62]  Referring Physician:  MEHUL Gutierres - CNP  MD Orders:  PT Eval and Treat   Return MD Appt:  TBD by pt  Date of Onset:       Allergies:  Patient has no known allergies. 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 She reports the pain feels like a tight cramp, and is only in her hip (does not travel down the leg). She denies numbness and tingling. 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 In the last week, she reports worst pain 6/10, best/ current pain 2/10.    Patient Stated Goal(s):  \"to have less pain\"  Initial:      /10 Post Session:      /10  Past Medical History/Comorbidities:   Ms. Chrissy Magdaleno  has a past medical history of Abnormal Pap smear of cervix, Abnormal Papanicolaou smear of cervix, Anemia, Diabetes ([address] Essential hypertension, Genital herpes, Gestational diabetes, History of chlamydia, History of cholecystectomy, Hypertension, Hyperthyroidism, Marijuana use, Obesity (BMI 30-39.9), Smoker, and Ventricular septal defect (VSD) of fetus in villalobos pregnancy, antepartum. Ms. Lauri Calderon  has a past surgical history that includes  section (); gyn; gi; Cholecystectomy, laparoscopic; and Colonoscopy (2022). 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           Learning:   Does the patient/guardian have any barriers to learning?: No barriers     Fall Risk Scale:    Marquez Total Score: 0  Marquez Fall Risk: Low (0-24)           OBJECTIVE       Gait:   - L LE antalgic,  decrease L stance phase, decreased L hip stance time, hip lurching, increased lateral trunk sway       Range Of Motion    -No change in hip w/ lumbar flexion and extension    Joint: Passive Passive    Right (Degrees) Left (Degrees)   Hip Flexion 110 90 w/ muscle guaring   Hip Extension 0 0   Hip Abduction  WNL   Hip Internal Rotation 75% w/ muscle guarding  <25% w/ muscle guarding   Hip External Rotation WNL 75%   Knee Flexion WNL WNL   Knee Extension WNL WNL   Ankle Dorsiflexion WNL WNL   Ankle Plantarflexion WNL WNL     Strength  Joint:      RIGHT LEFT   Hip Flexion 4+/5 4-/5   Hip Extension 3/5 3-/5   Hip Internal Rotation 4-/5 3/5   Hip External Rotation 4/5 3/5   Hip Abduction 3/5 3/5   Knee Flexion 5/5 4/5   Knee Extension 5/5 5/5     BALANCE Date:   Date:    Right 8\"    Left 9\"      Neuro-Vascular  C/O Radicular Symptoms:    denies radicular symptoms                                                                                        LE NEUROLOGICAL SCREEN:     -Dermatomoes  Date:     Right Left   L1/2 WNL WNL   L3 WNL WNL   L4 WNL WNL   L5 WNL WNL   S1 WNL WNL   S2 WNL WNL       Joint Mobilization: muscle guarding appreciated in bilateral hips, increased in L hip. Otherwise soft end feel  Special Tests:   DEANNA (+) L  FADDIR(+) L  Long Axis Distraction (-) Bilateral   ASSESSMENT   Initial Assessment:     Naina Hilton presents with signs and symptoms consistent w/ L hip articular pathology, decreased bilateral hip ROM, bilateral gluteal weakness, impaired balance, and impaired gait. 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 At his this time, she will benefit from skilled therapy to address the aforementioned impairments and restore PLOF. Problem List: (Impacting functional limitations): Body Structures, Functions, Activity Limitations Requiring Skilled Therapeutic Intervention: Decreased functional mobility ; Decreased ADL status; Decreased ROM; Decreased body mechanics; Decreased strength; Decreased endurance; Decreased sensation; Decreased coordination; Increased pain; Decreased posture     Therapy Prognosis:   Therapy Prognosis: Good     Initial Assessment Complexity:   Decision Making: Low Complexity    PLAN   Effective Dates:  TO Plan of Care/Certification Expiration Date: 05/03/23   Frequency/Duration: Plan Frequency: 2x week   Interventions Planned (Treatment may consist of any combination of the following):    Current Treatment Recommendations: Strengthening; ROM; Balance training; Functional mobility training; Endurance training; Gait training; Stair training; Neuromuscular re-education; Manual; Return to work related activity; Home exercise program; Modalities; Dry needling; Aquatics; Therapeutic activities;  Vestibular rehab     Goals: (Goals have been discussed and agreed upon with patient.)    GOALS: (Goals have been discussed and agreed upon with patient.)  Short-Term Functional Goals: Time Frame: 4 weeks  Guy Jenkins will be compliant with HEP. Guy Jenkins will report no more than  4/10 pain at worst in order to improve work tolerance  Guy Jenkins will score at least  35/80 on LEFS demonstrating overall improvements in functional mobility   Guy Jenkins will demonstrate 4/5 LE strength in order to improve stair climbing    Discharge Goals: Time Frame: 12 weeks  Guy Jenkins will be independent with HEP. 2.   Guy Jenkins will report no more than  1/10 pain at worst in order to improve work tolerance  3. Guy Jenkins will score at least 50/80 on LEFS demonstrating overall improvements in functional  mobility   4. Guy Jenkins will demonstrate 30  seconds SLS balance in order to reduce falls risk             Outcome Measure: Tool Used: Lower Extremity Functional Scale (LEFS)  Score:  Initial: 20/80 Most Recent: X/80 (Date: -- )   Interpretation of Score: 20 questions each scored on a 5 point scale with 0 representing \"extreme difficulty or unable to perform\" and 4 representing \"no difficulty\". The lower the score, the greater the functional disability. 80/80 represents no disability. Minimal detectable change is 9 points. Medical Necessity:   > Patient demonstrates good rehab potential due to higher previous functional level. Reason For Services/Other Comments:  > Patient continues to require skilled intervention due to severity of symptoms. Total Duration:       Regarding Alycia Resendiz's therapy, I certify that the treatment plan above will be carried out by a therapist or under their direction.   Thank you for this referral,  Loren Valle, PT     Referring Physician Signature: Magdalena Martinez*                    Post Session Pain  Charge Capture  PT Visit Info MD Guidelines  Maliha

## 2023-02-21 ENCOUNTER — HOSPITAL ENCOUNTER (OUTPATIENT)
Dept: PHYSICAL THERAPY | Age: 43
Setting detail: RECURRING SERIES
Discharge: HOME | End: 2023-02-24
Payer: COMMERCIAL

## 2023-02-21 PROCEDURE — 97110 THERAPEUTIC EXERCISES: CPT

## 2023-02-21 NOTE — PROGRESS NOTES
Timmie Cranker  : 1980  Primary: [address] (Medicaid Managed)  Secondary:  [address] @ [address] [place], [place] [address]   [facility] [place] [address]  Phone: [phone]  Fax: [phone] Plan Frequency: 2x week  Plan of Care/Certification Expiration Date: 23      PT Visit Info:  Plan Frequency: 2x week  Plan of Care/Certification Expiration Date: 23  Total # of Visits to Date: 1  Progress Note Counter: 1      Visit Count:  2    OUTPATIENT PHYSICAL THERAPY:OP NOTE TYPE: Treatment Note 2023       Episode  }Appt Desk             Treatment Diagnosis:    Pain in left hip (M25.552)  Stiffness of Left Hip, Not elsewhere classified (M25.652)  Difficulty in walking, Not elsewhere classified (R26.2)  Other abnormalities of gait and mobility (R26.89)  Generalized Muscle Weakness (M62.81)  Other lack of cordination (R27.8)  Medical/Referring Diagnosis:  Greater trochanteric bursitis, left [M70.62]  Referring Physician:  Fredrick Mak, MEHUL - CNP  MD Orders:  PT Eval and Treat   Date of Onset:  No data recorded   Allergies:   Patient has no known allergies. Restrictions/Precautions:  No data recorded  No data recorded   Interventions Planned (Treatment may consist of any combination of the following):    Current Treatment Recommendations: Strengthening; ROM; Balance training; Functional mobility training; Endurance training; Gait training; Stair training; Neuromuscular re-education; Manual; Return to work related activity; Home exercise program; Modalities; Dry needling; Aquatics; Therapeutic activities;  Vestibular rehab     Subjective Comments: reports her hip is doing better today     Initial:}    2 /10Post Session:      2  /10  Medications Last Reviewed:  2023  Updated Objective Findings:  See evaluation note from today  Treatment   THERAPEUTIC EXERCISE: (39 minutes):       Date:    EVAL ONLY Date:     Date:     Activity/Exercise Parameters Parameters Parameters   Education HEP, POC, hip anatomy HEP update    Warm Up  7 min Nu Step    Bridges  3 x 10     Clamshells  3 x 10    Side Stepping   x 25'     STS   3 x 12             MANUAL THERAPY: (0 minutes):     Treatment/Session Summary:    Treatment Assessment:    Great intro to LE strengthening today, with verbal cues required to use glutes. HEP updated. Will continue to strengthen per POC. Communication/Consultation:  None today  Equipment provided today:  None  Recommendations/Intent for next treatment session: Next visit will focus on progressive strenthening.     Total Treatment Billable Duration:  39 minutes  Time In: 0930  Time Out: 1401 Add2paper, PT       HEP 2/21: bridges, Clamshells, STSs  Charge Capture  }Post Session Pain  PT Visit Info  [employer] Portal  MD Guidelines  Scanned Media  Benefits  MyChart    Future Appointments   Date Time Provider Desi Zaragoza   2/28/2023  8:45 AM Yessenia Cantu PTA [facility]   3/2/2023  8:45 AM Yessenia Cantu [facility]   3/3/2023  1:30 PM RAMON Candelaria [facility]B   3/7/2023  8:45 AM Dick Camacho PT [facility]   3/9/2023  8:00 AM Dick Camacho PT [facility]   3/9/2023  9:45 AM Deangelo Horta MD [facility]

## 2023-02-23 NOTE — PERIOP NOTE
Patient verified name and . Order for consent found in EHR and matches case posting; patient verifies procedure. Type 2 surgery, PAT phone assessment complete. Orders received. Labs per surgeon: none  Labs per anesthesia protocol: Hgb- to be done on 23  EKG: Pt aware to come at 1300 on 23. Patient answered medical/surgical history questions at their best of ability. All prior to admission medications documented in [facility]. Patient instructed to take the following medications the day of surgery according to anesthesia guidelines with a small sip of water: none. On the day before surgery please take Acetaminophen 1000mg in the morning and then again before bed. You may substitute for Tylenol 650 mg. Hold all vitamins 7 days prior to surgery and NSAIDS (ibuprofen) 5 days prior to surgery. Patient instructed on the following:    > Arrive at A Entrance, time of arrival to be called the day before by 1700  > NPO after midnight, unless otherwise indicated, including gum, mints, and ice chips  > Responsible adult must drive patient to the hospital, stay during surgery, and patient will need supervision 24 hours after anesthesia  > Use antibacterial soap in shower the night before surgery and on the morning of surgery  > All piercings must be removed prior to arrival.    > Leave all valuables (money and jewelry) at home but bring insurance card and ID on DOS.   > You may be required to pay a deductible or co-pay on the day of your procedure. You can pre-pay by calling [phone] if your surgery is at the [facility] or [phone] if your surgery is at the [facility]. > Do not wear make-up, nail polish, lotions, cologne, perfumes, powders, or oil on skin. Artificial nails are not permitted.

## 2023-02-28 ENCOUNTER — HOSPITAL ENCOUNTER (OUTPATIENT)
Dept: PHYSICAL THERAPY | Age: 43
Setting detail: RECURRING SERIES
End: 2023-02-28
Payer: COMMERCIAL

## 2023-02-28 ENCOUNTER — HOSPITAL ENCOUNTER (OUTPATIENT)
Dept: SURGERY | Age: 43
Discharge: HOME | End: 2023-03-03
Payer: COMMERCIAL

## 2023-02-28 LAB
EKG ATRIAL RATE: 73 BPM
EKG DIAGNOSIS: NORMAL
EKG P AXIS: 15 DEGREES
EKG P-R INTERVAL: 152 MS
EKG Q-T INTERVAL: 404 MS
EKG QRS DURATION: 88 MS
EKG QTC CALCULATION (BAZETT): 445 MS
EKG R AXIS: -22 DEGREES
EKG T AXIS: 13 DEGREES
EKG VENTRICULAR RATE: 73 BPM
HGB BLD-MCNC: 11.8 G/DL (ref 11.7–15.4)

## 2023-02-28 PROCEDURE — 85018 HEMOGLOBIN: CPT

## 2023-02-28 PROCEDURE — 93005 ELECTROCARDIOGRAM TRACING: CPT | Performed by: ANESTHESIOLOGY

## 2023-02-28 NOTE — PROGRESS NOTES
Graham Shirley  : 1980  Primary: [address]  Secondary:  [address] @ [address] [facility]  [place]ur [place] [address]  Phone: [phone]  Fax: [phone] Plan Frequency: 2x week    Plan of Care/Certification Expiration Date: 23      PT Visit Info: Total # of Visits to Date: 1  Progress Note Counter: 1  Canceled Appointment: 1 (school meeting)         OUTPATIENT PHYSICAL THERAPY 2023     Appt Desk   Episode   MyChart      Ms. Anisha Dick was a same-day cancellation for today's appointment.      Cancellation Number: 1 (school meeting)     Teresita Carolina PTA    Future Appointments   Date Time Provider Desi Zaragoza   2023  1:30 PM [facility] ASSESSMENT RM 04 [facility]   3/2/2023  8:45 AM Teresita Carolina PTA [facility]   3/3/2023  1:30 PM RAMON Turk [facility] AMB   3/7/2023  8:45 AM Tho Palomino, PT [facility]   3/9/2023  8:00 AM Tho Palomino PT [facility]   3/9/2023  9:45 AM MD AIRAM Cuellar [facility] AMB

## 2023-03-02 ENCOUNTER — ANESTHESIA EVENT (OUTPATIENT)
Dept: SURGERY | Age: 43
End: 2023-03-02
Payer: COMMERCIAL

## 2023-03-02 NOTE — DISCHARGE INSTRUCTIONS
Thyroid Surgery      -You may be scheduled to stay overnight at the hospital.  If NOT, and you have a drain, you will need to contact the office at [phone] to schedule an appointment for two days later to have your drain removed. -You will be prescribed pain medication. Please fill the prescriptions prior to surgery, but DO NOT start taking them until after surgery as directed by your doctor.    -After surgery, you may feel comfortable starting a liquid/soft diet. You may advance solid foods into your diet as tolerable. -It is important you always keep the area DRY. If there is a drain in place, no showers, as it cannot get wet. If no drain, you may shower and clean crusting from the incision with half hydrogen peroxide and half water.      -Please apply an antibiotic ointment to this area three times daily for three days, then use Vaseline two times daily for two weeks. After that you can use Mederma or other silicone-based scar-away cream, if desired.      -Please call the office if you notice and swelling, abnormal bleeding, discharge and/or high fevers. (101 degrees or higher), (swelling below the incision only, is usually normal)    -If you develop numbness of lips, fingertips, or muscle cramps, take four Tums, and call the office.    -Do not take Tylenol if using prescribed pain medicine, as it already has Tylenol in it.    -If your entire Thyroid has been removed, start taking Synthroid every day. DISCHARGE SUMMARY from Nurse    PATIENT INSTRUCTIONS:    After general anesthesia or intravenous sedation, for 24 hours or while taking prescription Narcotics:  Limit your activities  Do not drive and operate hazardous machinery  Do not make important personal or business decisions  Do  not drink alcoholic beverages  If you have not urinated within 8 hours after discharge, please contact your surgeon on call.     Report the following to your surgeon:  Excessive pain, swelling, redness or odor of or around the surgical area  Temperature over 100.5  Nausea and vomiting lasting longer than 4 hours or if unable to take medications  Any signs of decreased circulation or nerve impairment to extremity: change in color, persistent  numbness, tingling, coldness or increase pain  Any questions    What to do at Home:  Recommended activity: activity as tolerated,         *  Please give a list of your current medications to your Primary Care Provider. *  Please update this list whenever your medications are discontinued, doses are      changed, or new medications (including over-the-counter products) are added. *  Please carry medication information at all times in case of emergency situations. These are general instructions for a healthy lifestyle:    No smoking/ No tobacco products/ Avoid exposure to second hand smoke  Surgeon General's Warning:  Quitting smoking now greatly reduces serious risk to your health. Obesity, smoking, and sedentary lifestyle greatly increases your risk for illness    A healthy diet, regular physical exercise & weight monitoring are important for maintaining a healthy lifestyle    You may be retaining fluid if you have a history of heart failure or if you experience any of the following symptoms:  Weight gain of 3 pounds or more overnight or 5 pounds in a week, increased swelling in our hands or feet or shortness of breath while lying flat in bed. Please call your doctor as soon as you notice any of these symptoms; do not wait until your next office visit. The discharge information has been reviewed with the patient. The patient verbalized understanding. Discharge medications reviewed with the patient and appropriate educational materials and side effects teaching were provided.   ___________________________________________________________________________________________________________________________________

## 2023-03-03 ENCOUNTER — ANESTHESIA (OUTPATIENT)
Dept: SURGERY | Age: 43
End: 2023-03-03
Payer: COMMERCIAL

## 2023-03-03 ENCOUNTER — HOSPITAL ENCOUNTER (INPATIENT)
Age: 43
LOS: 1 days | Discharge: HOME | DRG: 404 | End: 2023-03-04
Attending: STUDENT IN AN ORGANIZED HEALTH CARE EDUCATION/TRAINING PROGRAM | Admitting: STUDENT IN AN ORGANIZED HEALTH CARE EDUCATION/TRAINING PROGRAM
Payer: COMMERCIAL

## 2023-03-03 DIAGNOSIS — G89.18 POST-OP PAIN: Primary | ICD-10-CM

## 2023-03-03 DIAGNOSIS — E89.0 POST-SURGICAL HYPOTHYROIDISM: Primary | ICD-10-CM

## 2023-03-03 DIAGNOSIS — E11.65 TYPE 2 DIABETES MELLITUS WITH HYPERGLYCEMIA, WITHOUT LONG-TERM CURRENT USE OF INSULIN (HCC): ICD-10-CM

## 2023-03-03 DIAGNOSIS — E04.9 GOITER: ICD-10-CM

## 2023-03-03 PROBLEM — E05.00 GRAVES DISEASE: Status: ACTIVE | Noted: 2023-03-03

## 2023-03-03 LAB
ABO + RH BLD: NORMAL
BLOOD GROUP ANTIBODIES SERPL: NORMAL
GLUCOSE BLD STRIP.AUTO-MCNC: 134 MG/DL (ref 65–100)
HCG UR QL: NEGATIVE
SERVICE CMNT-IMP: ABNORMAL
SPECIMEN EXP DATE BLD: NORMAL

## 2023-03-03 PROCEDURE — 88307 TISSUE EXAM BY PATHOLOGIST: CPT

## 2023-03-03 PROCEDURE — 6370000000 HC RX 637 (ALT 250 FOR IP): Performed by: STUDENT IN AN ORGANIZED HEALTH CARE EDUCATION/TRAINING PROGRAM

## 2023-03-03 PROCEDURE — 60240 REMOVAL OF THYROID: CPT | Performed by: STUDENT IN AN ORGANIZED HEALTH CARE EDUCATION/TRAINING PROGRAM

## 2023-03-03 PROCEDURE — 7100000001 HC PACU RECOVERY - ADDTL 15 MIN: Performed by: STUDENT IN AN ORGANIZED HEALTH CARE EDUCATION/TRAINING PROGRAM

## 2023-03-03 PROCEDURE — 6360000002 HC RX W HCPCS: Performed by: ANESTHESIOLOGY

## 2023-03-03 PROCEDURE — 3600000014 HC SURGERY LEVEL 4 ADDTL 15MIN: Performed by: STUDENT IN AN ORGANIZED HEALTH CARE EDUCATION/TRAINING PROGRAM

## 2023-03-03 PROCEDURE — 83970 ASSAY OF PARATHORMONE: CPT

## 2023-03-03 PROCEDURE — 1100000000 HC RM PRIVATE

## 2023-03-03 PROCEDURE — 0GBH0ZZ EXCISION OF RIGHT THYROID GLAND LOBE, OPEN APPROACH: ICD-10-PCS | Performed by: STUDENT IN AN ORGANIZED HEALTH CARE EDUCATION/TRAINING PROGRAM

## 2023-03-03 PROCEDURE — 6360000002 HC RX W HCPCS: Performed by: STUDENT IN AN ORGANIZED HEALTH CARE EDUCATION/TRAINING PROGRAM

## 2023-03-03 PROCEDURE — 7100000000 HC PACU RECOVERY - FIRST 15 MIN: Performed by: STUDENT IN AN ORGANIZED HEALTH CARE EDUCATION/TRAINING PROGRAM

## 2023-03-03 PROCEDURE — 82962 GLUCOSE BLOOD TEST: CPT

## 2023-03-03 PROCEDURE — 3600000004 HC SURGERY LEVEL 4 BASE: Performed by: STUDENT IN AN ORGANIZED HEALTH CARE EDUCATION/TRAINING PROGRAM

## 2023-03-03 PROCEDURE — 2580000003 HC RX 258: Performed by: STUDENT IN AN ORGANIZED HEALTH CARE EDUCATION/TRAINING PROGRAM

## 2023-03-03 PROCEDURE — 2720000010 HC SURG SUPPLY STERILE: Performed by: STUDENT IN AN ORGANIZED HEALTH CARE EDUCATION/TRAINING PROGRAM

## 2023-03-03 PROCEDURE — 2500000003 HC RX 250 WO HCPCS: Performed by: STUDENT IN AN ORGANIZED HEALTH CARE EDUCATION/TRAINING PROGRAM

## 2023-03-03 PROCEDURE — 2580000003 HC RX 258: Performed by: ANESTHESIOLOGY

## 2023-03-03 PROCEDURE — 82330 ASSAY OF CALCIUM: CPT

## 2023-03-03 PROCEDURE — 6360000002 HC RX W HCPCS: Performed by: NURSE ANESTHETIST, CERTIFIED REGISTERED

## 2023-03-03 PROCEDURE — 2500000003 HC RX 250 WO HCPCS

## 2023-03-03 PROCEDURE — 0GBG0ZZ EXCISION OF LEFT THYROID GLAND LOBE, OPEN APPROACH: ICD-10-PCS | Performed by: STUDENT IN AN ORGANIZED HEALTH CARE EDUCATION/TRAINING PROGRAM

## 2023-03-03 PROCEDURE — 86901 BLOOD TYPING SEROLOGIC RH(D): CPT

## 2023-03-03 PROCEDURE — 2500000003 HC RX 250 WO HCPCS: Performed by: NURSE ANESTHETIST, CERTIFIED REGISTERED

## 2023-03-03 PROCEDURE — 81025 URINE PREGNANCY TEST: CPT

## 2023-03-03 PROCEDURE — 3700000000 HC ANESTHESIA ATTENDED CARE: Performed by: STUDENT IN AN ORGANIZED HEALTH CARE EDUCATION/TRAINING PROGRAM

## 2023-03-03 PROCEDURE — G0378 HOSPITAL OBSERVATION PER HR: HCPCS

## 2023-03-03 PROCEDURE — 3700000001 HC ADD 15 MINUTES (ANESTHESIA): Performed by: STUDENT IN AN ORGANIZED HEALTH CARE EDUCATION/TRAINING PROGRAM

## 2023-03-03 PROCEDURE — 94760 N-INVAS EAR/PLS OXIMETRY 1: CPT

## 2023-03-03 PROCEDURE — 36415 COLL VENOUS BLD VENIPUNCTURE: CPT

## 2023-03-03 PROCEDURE — 2709999900 HC NON-CHARGEABLE SUPPLY: Performed by: STUDENT IN AN ORGANIZED HEALTH CARE EDUCATION/TRAINING PROGRAM

## 2023-03-03 PROCEDURE — 6370000000 HC RX 637 (ALT 250 FOR IP): Performed by: NURSE ANESTHETIST, CERTIFIED REGISTERED

## 2023-03-03 RX ORDER — ACETAMINOPHEN 500 MG
1000 TABLET ORAL ONCE
Status: DISCONTINUED | OUTPATIENT
Start: 2023-03-03 | End: 2023-03-03 | Stop reason: HOSPADM

## 2023-03-03 RX ORDER — MORPHINE SULFATE 4 MG/ML
4 INJECTION INTRAVENOUS
Status: DISCONTINUED | OUTPATIENT
Start: 2023-03-03 | End: 2023-03-04 | Stop reason: HOSPADM

## 2023-03-03 RX ORDER — SUCCINYLCHOLINE/SOD CL,ISO/PF 200MG/10ML
SYRINGE (ML) INTRAVENOUS PRN
Status: DISCONTINUED | OUTPATIENT
Start: 2023-03-03 | End: 2023-03-03 | Stop reason: SDUPTHER

## 2023-03-03 RX ORDER — APREPITANT 40 MG/1
40 CAPSULE ORAL ONCE
Status: COMPLETED | OUTPATIENT
Start: 2023-03-03 | End: 2023-03-03

## 2023-03-03 RX ORDER — HYDRALAZINE HYDROCHLORIDE 20 MG/ML
20 INJECTION INTRAMUSCULAR; INTRAVENOUS EVERY 6 HOURS PRN
Status: DISCONTINUED | OUTPATIENT
Start: 2023-03-03 | End: 2023-03-04 | Stop reason: HOSPADM

## 2023-03-03 RX ORDER — OYSTER SHELL CALCIUM WITH VITAMIN D 500; 200 MG/1; [IU]/1
TABLET, FILM COATED ORAL
Qty: 84 TABLET | Refills: 0 | Status: SHIPPED | OUTPATIENT
Start: 2023-03-03

## 2023-03-03 RX ORDER — ONDANSETRON 4 MG/1
4 TABLET, ORALLY DISINTEGRATING ORAL EVERY 8 HOURS PRN
Status: DISCONTINUED | OUTPATIENT
Start: 2023-03-03 | End: 2023-03-04 | Stop reason: HOSPADM

## 2023-03-03 RX ORDER — SODIUM CHLORIDE 9 MG/ML
INJECTION, SOLUTION INTRAVENOUS PRN
Status: DISCONTINUED | OUTPATIENT
Start: 2023-03-03 | End: 2023-03-03 | Stop reason: HOSPADM

## 2023-03-03 RX ORDER — ONDANSETRON 2 MG/ML
4 INJECTION INTRAMUSCULAR; INTRAVENOUS EVERY 6 HOURS PRN
Status: DISCONTINUED | OUTPATIENT
Start: 2023-03-03 | End: 2023-03-04 | Stop reason: HOSPADM

## 2023-03-03 RX ORDER — CALCIUM GLUCONATE 94 MG/ML
2000 INJECTION, SOLUTION INTRAVENOUS ONCE
Status: DISCONTINUED | OUTPATIENT
Start: 2023-03-03 | End: 2023-03-03 | Stop reason: SDUPTHER

## 2023-03-03 RX ORDER — HYDROMORPHONE HCL 110MG/55ML
PATIENT CONTROLLED ANALGESIA SYRINGE INTRAVENOUS PRN
Status: DISCONTINUED | OUTPATIENT
Start: 2023-03-03 | End: 2023-03-03 | Stop reason: SDUPTHER

## 2023-03-03 RX ORDER — SODIUM CHLORIDE 0.9 % (FLUSH) 0.9 %
5-40 SYRINGE (ML) INJECTION PRN
Status: DISCONTINUED | OUTPATIENT
Start: 2023-03-03 | End: 2023-03-04 | Stop reason: HOSPADM

## 2023-03-03 RX ORDER — ONDANSETRON 2 MG/ML
INJECTION INTRAMUSCULAR; INTRAVENOUS PRN
Status: DISCONTINUED | OUTPATIENT
Start: 2023-03-03 | End: 2023-03-03 | Stop reason: SDUPTHER

## 2023-03-03 RX ORDER — FENTANYL CITRATE 50 UG/ML
INJECTION, SOLUTION INTRAMUSCULAR; INTRAVENOUS PRN
Status: DISCONTINUED | OUTPATIENT
Start: 2023-03-03 | End: 2023-03-03 | Stop reason: SDUPTHER

## 2023-03-03 RX ORDER — OXYCODONE HYDROCHLORIDE 5 MG/1
10 TABLET ORAL EVERY 4 HOURS PRN
Status: DISCONTINUED | OUTPATIENT
Start: 2023-03-03 | End: 2023-03-04 | Stop reason: HOSPADM

## 2023-03-03 RX ORDER — TAMSULOSIN HYDROCHLORIDE 0.4 MG/1
0.4 CAPSULE ORAL DAILY
Status: DISCONTINUED | OUTPATIENT
Start: 2023-03-03 | End: 2023-03-04 | Stop reason: HOSPADM

## 2023-03-03 RX ORDER — MIDAZOLAM HYDROCHLORIDE 1 MG/ML
2 INJECTION INTRAMUSCULAR; INTRAVENOUS
Status: COMPLETED | OUTPATIENT
Start: 2023-03-03 | End: 2023-03-03

## 2023-03-03 RX ORDER — PROPOFOL 10 MG/ML
INJECTION, EMULSION INTRAVENOUS PRN
Status: DISCONTINUED | OUTPATIENT
Start: 2023-03-03 | End: 2023-03-03 | Stop reason: SDUPTHER

## 2023-03-03 RX ORDER — PROCHLORPERAZINE EDISYLATE 5 MG/ML
5 INJECTION INTRAMUSCULAR; INTRAVENOUS
Status: DISCONTINUED | OUTPATIENT
Start: 2023-03-03 | End: 2023-03-03 | Stop reason: HOSPADM

## 2023-03-03 RX ORDER — SODIUM CHLORIDE, SODIUM LACTATE, POTASSIUM CHLORIDE, CALCIUM CHLORIDE 600; 310; 30; 20 MG/100ML; MG/100ML; MG/100ML; MG/100ML
INJECTION, SOLUTION INTRAVENOUS CONTINUOUS
Status: DISCONTINUED | OUTPATIENT
Start: 2023-03-03 | End: 2023-03-04 | Stop reason: HOSPADM

## 2023-03-03 RX ORDER — POLYETHYLENE GLYCOL 3350 17 G/17G
17 POWDER, FOR SOLUTION ORAL DAILY PRN
Status: DISCONTINUED | OUTPATIENT
Start: 2023-03-03 | End: 2023-03-04 | Stop reason: HOSPADM

## 2023-03-03 RX ORDER — SODIUM CHLORIDE 9 MG/ML
INJECTION, SOLUTION INTRAVENOUS PRN
Status: DISCONTINUED | OUTPATIENT
Start: 2023-03-03 | End: 2023-03-04 | Stop reason: HOSPADM

## 2023-03-03 RX ORDER — CALCIUM CARBONATE 500(1250)
1000 TABLET ORAL 3 TIMES DAILY
Status: DISCONTINUED | OUTPATIENT
Start: 2023-03-03 | End: 2023-03-04 | Stop reason: HOSPADM

## 2023-03-03 RX ORDER — SODIUM CHLORIDE 0.9 % (FLUSH) 0.9 %
5-40 SYRINGE (ML) INJECTION EVERY 12 HOURS SCHEDULED
Status: DISCONTINUED | OUTPATIENT
Start: 2023-03-03 | End: 2023-03-03 | Stop reason: HOSPADM

## 2023-03-03 RX ORDER — MORPHINE SULFATE 2 MG/ML
2 INJECTION, SOLUTION INTRAMUSCULAR; INTRAVENOUS
Status: DISCONTINUED | OUTPATIENT
Start: 2023-03-03 | End: 2023-03-04 | Stop reason: HOSPADM

## 2023-03-03 RX ORDER — SODIUM CHLORIDE 0.9 % (FLUSH) 0.9 %
5-40 SYRINGE (ML) INJECTION EVERY 12 HOURS SCHEDULED
Status: DISCONTINUED | OUTPATIENT
Start: 2023-03-03 | End: 2023-03-04 | Stop reason: HOSPADM

## 2023-03-03 RX ORDER — CALCIUM GLUCONATE 20 MG/ML
2000 INJECTION, SOLUTION INTRAVENOUS ONCE
Status: COMPLETED | OUTPATIENT
Start: 2023-03-03 | End: 2023-03-03

## 2023-03-03 RX ORDER — DIPHENHYDRAMINE HYDROCHLORIDE 50 MG/ML
12.5 INJECTION INTRAMUSCULAR; INTRAVENOUS
Status: DISCONTINUED | OUTPATIENT
Start: 2023-03-03 | End: 2023-03-03 | Stop reason: HOSPADM

## 2023-03-03 RX ORDER — LABETALOL HYDROCHLORIDE 5 MG/ML
10 INJECTION, SOLUTION INTRAVENOUS ONCE
Status: COMPLETED | OUTPATIENT
Start: 2023-03-03 | End: 2023-03-03

## 2023-03-03 RX ORDER — ONDANSETRON 4 MG/1
4 TABLET, ORALLY DISINTEGRATING ORAL 3 TIMES DAILY PRN
Qty: 12 TABLET | Refills: 0 | Status: SHIPPED | OUTPATIENT
Start: 2023-03-03 | End: 2023-03-04 | Stop reason: HOSPADM

## 2023-03-03 RX ORDER — ALBUTEROL SULFATE 90 UG/1
AEROSOL, METERED RESPIRATORY (INHALATION) PRN
Status: DISCONTINUED | OUTPATIENT
Start: 2023-03-03 | End: 2023-03-03 | Stop reason: SDUPTHER

## 2023-03-03 RX ORDER — OXYCODONE HYDROCHLORIDE 5 MG/1
5 TABLET ORAL EVERY 4 HOURS PRN
Status: DISCONTINUED | OUTPATIENT
Start: 2023-03-03 | End: 2023-03-04 | Stop reason: HOSPADM

## 2023-03-03 RX ORDER — FENTANYL CITRATE 50 UG/ML
100 INJECTION, SOLUTION INTRAMUSCULAR; INTRAVENOUS
Status: DISCONTINUED | OUTPATIENT
Start: 2023-03-03 | End: 2023-03-03 | Stop reason: HOSPADM

## 2023-03-03 RX ORDER — LEVOTHYROXINE SODIUM 175 UG/1
175 TABLET ORAL DAILY
Qty: 90 TABLET | Refills: 5 | Status: SHIPPED | OUTPATIENT
Start: 2023-03-03

## 2023-03-03 RX ORDER — LIDOCAINE HYDROCHLORIDE 10 MG/ML
1 INJECTION, SOLUTION INFILTRATION; PERINEURAL
Status: DISCONTINUED | OUTPATIENT
Start: 2023-03-03 | End: 2023-03-03 | Stop reason: HOSPADM

## 2023-03-03 RX ORDER — LABETALOL HYDROCHLORIDE 5 MG/ML
INJECTION, SOLUTION INTRAVENOUS
Status: COMPLETED
Start: 2023-03-03 | End: 2023-03-03

## 2023-03-03 RX ORDER — SODIUM CHLORIDE 0.9 % (FLUSH) 0.9 %
5-40 SYRINGE (ML) INJECTION PRN
Status: DISCONTINUED | OUTPATIENT
Start: 2023-03-03 | End: 2023-03-03 | Stop reason: HOSPADM

## 2023-03-03 RX ORDER — SODIUM CHLORIDE, SODIUM LACTATE, POTASSIUM CHLORIDE, CALCIUM CHLORIDE 600; 310; 30; 20 MG/100ML; MG/100ML; MG/100ML; MG/100ML
INJECTION, SOLUTION INTRAVENOUS CONTINUOUS
Status: DISCONTINUED | OUTPATIENT
Start: 2023-03-03 | End: 2023-03-03 | Stop reason: HOSPADM

## 2023-03-03 RX ORDER — DOXYCYCLINE HYCLATE 100 MG
100 TABLET ORAL 2 TIMES DAILY
Qty: 10 TABLET | Refills: 0 | Status: SHIPPED | OUTPATIENT
Start: 2023-03-03 | End: 2023-03-08

## 2023-03-03 RX ORDER — DEXAMETHASONE SODIUM PHOSPHATE 10 MG/ML
INJECTION INTRAMUSCULAR; INTRAVENOUS PRN
Status: DISCONTINUED | OUTPATIENT
Start: 2023-03-03 | End: 2023-03-03 | Stop reason: SDUPTHER

## 2023-03-03 RX ORDER — OXYCODONE HYDROCHLORIDE AND ACETAMINOPHEN 5; 325 MG/1; MG/1
1 TABLET ORAL EVERY 6 HOURS PRN
Qty: 24 TABLET | Refills: 0 | Status: SHIPPED | OUTPATIENT
Start: 2023-03-03 | End: 2023-03-09

## 2023-03-03 RX ORDER — LIDOCAINE HYDROCHLORIDE AND EPINEPHRINE 10; 10 MG/ML; UG/ML
INJECTION, SOLUTION INFILTRATION; PERINEURAL PRN
Status: DISCONTINUED | OUTPATIENT
Start: 2023-03-03 | End: 2023-03-03 | Stop reason: HOSPADM

## 2023-03-03 RX ORDER — OXYCODONE HYDROCHLORIDE 5 MG/1
5 TABLET ORAL
Status: DISCONTINUED | OUTPATIENT
Start: 2023-03-03 | End: 2023-03-03 | Stop reason: HOSPADM

## 2023-03-03 RX ORDER — DEXTROSE MONOHYDRATE 100 MG/ML
INJECTION, SOLUTION INTRAVENOUS CONTINUOUS PRN
Status: DISCONTINUED | OUTPATIENT
Start: 2023-03-03 | End: 2023-03-03 | Stop reason: HOSPADM

## 2023-03-03 RX ORDER — LIDOCAINE HYDROCHLORIDE 20 MG/ML
INJECTION, SOLUTION EPIDURAL; INFILTRATION; INTRACAUDAL; PERINEURAL PRN
Status: DISCONTINUED | OUTPATIENT
Start: 2023-03-03 | End: 2023-03-03 | Stop reason: SDUPTHER

## 2023-03-03 RX ADMIN — LABETALOL HYDROCHLORIDE 10 MG: 5 INJECTION, SOLUTION INTRAVENOUS at 13:25

## 2023-03-03 RX ADMIN — LABETALOL HYDROCHLORIDE 10 MG: 5 INJECTION INTRAVENOUS at 13:25

## 2023-03-03 RX ADMIN — SODIUM CHLORIDE, POTASSIUM CHLORIDE, SODIUM LACTATE AND CALCIUM CHLORIDE: 600; 310; 30; 20 INJECTION, SOLUTION INTRAVENOUS at 20:47

## 2023-03-03 RX ADMIN — PROPOFOL 50 MG: 10 INJECTION, EMULSION INTRAVENOUS at 09:45

## 2023-03-03 RX ADMIN — PHENYLEPHRINE HYDROCHLORIDE 100 MCG: 0.1 INJECTION, SOLUTION INTRAVENOUS at 11:10

## 2023-03-03 RX ADMIN — APREPITANT 40 MG: 40 CAPSULE ORAL at 07:36

## 2023-03-03 RX ADMIN — ONDANSETRON 4 MG: 2 INJECTION INTRAMUSCULAR; INTRAVENOUS at 20:40

## 2023-03-03 RX ADMIN — PHENYLEPHRINE HYDROCHLORIDE 50 MCG: 0.1 INJECTION, SOLUTION INTRAVENOUS at 11:07

## 2023-03-03 RX ADMIN — HYDROMORPHONE HYDROCHLORIDE 1 MG: 2 INJECTION INTRAMUSCULAR; INTRAVENOUS; SUBCUTANEOUS at 09:53

## 2023-03-03 RX ADMIN — MIDAZOLAM 2 MG: 1 INJECTION INTRAMUSCULAR; INTRAVENOUS at 08:01

## 2023-03-03 RX ADMIN — CALCIUM GLUCONATE 2000 MG: 20 INJECTION, SOLUTION INTRAVENOUS at 15:56

## 2023-03-03 RX ADMIN — DEXAMETHASONE SODIUM PHOSPHATE 10 MG: 10 INJECTION INTRAMUSCULAR; INTRAVENOUS at 09:40

## 2023-03-03 RX ADMIN — CALCIUM 1000 MG: 500 TABLET ORAL at 20:12

## 2023-03-03 RX ADMIN — MORPHINE SULFATE 4 MG: 4 INJECTION INTRAVENOUS at 14:52

## 2023-03-03 RX ADMIN — HYDROMORPHONE HYDROCHLORIDE 0.5 MG: 1 INJECTION, SOLUTION INTRAMUSCULAR; INTRAVENOUS; SUBCUTANEOUS at 12:36

## 2023-03-03 RX ADMIN — SODIUM CHLORIDE, PRESERVATIVE FREE 10 ML: 5 INJECTION INTRAVENOUS at 20:12

## 2023-03-03 RX ADMIN — Medication 2000 MG: at 17:52

## 2023-03-03 RX ADMIN — ONDANSETRON 4 MG: 2 INJECTION INTRAMUSCULAR; INTRAVENOUS at 09:30

## 2023-03-03 RX ADMIN — SODIUM CHLORIDE, SODIUM LACTATE, POTASSIUM CHLORIDE, AND CALCIUM CHLORIDE: 600; 310; 30; 20 INJECTION, SOLUTION INTRAVENOUS at 07:48

## 2023-03-03 RX ADMIN — HYDROMORPHONE HYDROCHLORIDE 0.5 MG: 1 INJECTION, SOLUTION INTRAMUSCULAR; INTRAVENOUS; SUBCUTANEOUS at 12:47

## 2023-03-03 RX ADMIN — LIDOCAINE HYDROCHLORIDE 100 MG: 20 INJECTION, SOLUTION EPIDURAL; INFILTRATION; INTRACAUDAL; PERINEURAL at 09:30

## 2023-03-03 RX ADMIN — ALBUTEROL SULFATE 4 PUFF: 108 INHALANT RESPIRATORY (INHALATION) at 09:35

## 2023-03-03 RX ADMIN — MORPHINE SULFATE 2 MG: 2 INJECTION, SOLUTION INTRAMUSCULAR; INTRAVENOUS at 21:01

## 2023-03-03 RX ADMIN — PROPOFOL 25 MCG/KG/MIN: 10 INJECTION, EMULSION INTRAVENOUS at 09:46

## 2023-03-03 RX ADMIN — SODIUM CHLORIDE, SODIUM LACTATE, POTASSIUM CHLORIDE, AND CALCIUM CHLORIDE: 600; 310; 30; 20 INJECTION, SOLUTION INTRAVENOUS at 11:22

## 2023-03-03 RX ADMIN — SODIUM CHLORIDE, SODIUM LACTATE, POTASSIUM CHLORIDE, AND CALCIUM CHLORIDE: 600; 310; 30; 20 INJECTION, SOLUTION INTRAVENOUS at 10:22

## 2023-03-03 RX ADMIN — Medication 200 MG: at 09:31

## 2023-03-03 RX ADMIN — OXYCODONE HYDROCHLORIDE 10 MG: 5 TABLET ORAL at 20:12

## 2023-03-03 RX ADMIN — HYDROMORPHONE HYDROCHLORIDE 1 MG: 2 INJECTION INTRAMUSCULAR; INTRAVENOUS; SUBCUTANEOUS at 09:38

## 2023-03-03 RX ADMIN — Medication 2000 MG: at 09:40

## 2023-03-03 RX ADMIN — FENTANYL CITRATE 100 MCG: 50 INJECTION, SOLUTION INTRAMUSCULAR; INTRAVENOUS at 09:30

## 2023-03-03 RX ADMIN — PHENYLEPHRINE HYDROCHLORIDE 100 MCG: 0.1 INJECTION, SOLUTION INTRAVENOUS at 11:13

## 2023-03-03 RX ADMIN — OXYCODONE HYDROCHLORIDE 10 MG: 5 TABLET ORAL at 16:24

## 2023-03-03 RX ADMIN — PROPOFOL 200 MG: 10 INJECTION, EMULSION INTRAVENOUS at 09:31

## 2023-03-03 ASSESSMENT — PAIN DESCRIPTION - PAIN TYPE
TYPE: SURGICAL PAIN
TYPE: SURGICAL PAIN

## 2023-03-03 ASSESSMENT — PAIN SCALES - GENERAL
PAINLEVEL_OUTOF10: 0
PAINLEVEL_OUTOF10: 8
PAINLEVEL_OUTOF10: 0
PAINLEVEL_OUTOF10: 10
PAINLEVEL_OUTOF10: 0
PAINLEVEL_OUTOF10: 6
PAINLEVEL_OUTOF10: 0
PAINLEVEL_OUTOF10: 7
PAINLEVEL_OUTOF10: 7
PAINLEVEL_OUTOF10: 10
PAINLEVEL_OUTOF10: 0

## 2023-03-03 ASSESSMENT — PAIN DESCRIPTION - FREQUENCY
FREQUENCY: INTERMITTENT
FREQUENCY: INTERMITTENT

## 2023-03-03 ASSESSMENT — PAIN DESCRIPTION - LOCATION
LOCATION: THROAT
LOCATION: THROAT
LOCATION: THROAT;INCISION
LOCATION: THROAT;INCISION

## 2023-03-03 ASSESSMENT — PAIN DESCRIPTION - DESCRIPTORS
DESCRIPTORS: ACHING
DESCRIPTORS: ACHING

## 2023-03-03 ASSESSMENT — PAIN DESCRIPTION - ONSET
ONSET: GRADUAL
ONSET: GRADUAL

## 2023-03-03 ASSESSMENT — PAIN - FUNCTIONAL ASSESSMENT: PAIN_FUNCTIONAL_ASSESSMENT: 0-10

## 2023-03-03 ASSESSMENT — PAIN DESCRIPTION - ORIENTATION
ORIENTATION: MID
ORIENTATION: MID

## 2023-03-03 ASSESSMENT — LIFESTYLE VARIABLES: SMOKING_STATUS: 1

## 2023-03-03 NOTE — OP NOTE
Operative Note      Patient: Lauren Boo  YOB: 1980  MRN: [id number]    Date of Procedure: 3/3/2023    Pre-Op Diagnosis: Graves' disease [E05.00]  Multinodular goiter [E04.2]    Post-Op Diagnosis: Same       Procedure(s):  THYROIDECTOMY    Surgeon(s):  Jono Morin MD    Assistant:   * No surgical staff found *    Anesthesia: General    Estimated Blood Loss (mL): less than 708     Complications: None    Specimens:   ID Type Source Tests Collected by Time Destination   A : thyroid Tissue Thyroid SURGICAL PATHOLOGY Jono Morin MD 3/3/2023 1135        Implants:  * No implants in log *      Drains:   Closed/Suction Drain Midline Neck Bulb (Active)       [REMOVED] Urinary Catheter 03/03/23 2 Way; Grijalva (Removed)       Findings: Large multinodular goiter, both recurrent laryngeal nerves preserved, all parathyroids preserved    Detailed Description of Procedure: The patient was identified in preoperative holding area. Informed consent was obtained. The patient was taken back to the operative suite laid supine on the operating table. Anesthesia was induced and the patient was intubated via glide scope with a nerve monitoring tube. Upper and lower extremity pressure points were padded. A shoulder roll was placed. The neuro monitoring system was set up calibrated and found be functioning appropriately. The planned incision site was marked and then injected with lidocaine with epinephrine. A preoperative timeout was performed. The patient was prepped and draped in the usual sterile fashion. Next #15 blade was used to make an incision down through the platysma. Metzenbaum scissors and Bovie were used to raise superior and inferiorly based subplatysmal flaps. The strap muscles were identified in the midline and the midline raphae was identified and divided. The strap muscles were then elevated off of the left lobe of the thyroid using a combination of bipolar and Kitner.   The superior pole vessels were then taken down using a combination of clips, bipolar, and LigaSure. Next the middle thyroid vein was identified and taken down using clips and LigaSure. The inferior aspect of the thyroid was then freed using the LigaSure. We were able to deliver the left thyroid lobe at this point. The recurrent laryngeal nerve was identified near the tubercle of Zuckerkandl. The nerve was dissected away from the thyroid lobe using Fountain's. The nerve was stimulated at 0.5 and found be functioning appropriately. Next a LigaSure was used to free the left thyroid lobe off of the trachea. Next attention was turned towards the the right thyroid lobe and the strap muscles were dissected off of the thyroid using a combination of Kitners, bipolar, and LigaSure. The middle thyroid vein was taken down using the LigaSure. The superior pole vessels were taken down using clips and LigaSure. The recurrent laryngeal nerve was identified near the tubercle of Zuckerkandl. The nerve was dissected away from the thyroid lobe using Fountain's. The nerve was stimulated at 0.5 and found be functioning appropriately. At this point the isthmus was dissected off of the trachea using LigaSure and Bovie. The thyroid was removed in its entirety and the right superior pole was marked with a stitch. A 10 [employer] DOC drain was placed and secured with a 2-0 silk suture. Wound was irrigated with saline and suctioned out. There was excellent hemostasis. A Valsalva was performed again confirming excellent hemostasis. Surgicel was placed over the recurrent laryngeal nerve. The strap muscles were closed in the midline using a single horizontal mattress 3-0 Vicryl suture. The platysma layer was then closed using 3-0 Vicryl. A 5-0 Monocryl was used to place a running subcuticular stitch. Dermabond and Prineo were placed. The patient was turned back to anesthesia extubated taken the PACU in stable condition. Electronically signed by Mario Arreola MD on 3/3/2023 at 12:32 PM

## 2023-03-03 NOTE — ANESTHESIA POSTPROCEDURE EVALUATION
Department of Anesthesiology  Postprocedure Note    Patient: Aida Renteria  MRN: [id number]  YOB: 1980  Date of evaluation: 3/3/2023      Procedure Summary     Date: 03/03/23 Room / Location: [facility] [place] MAIN OR 02 / [facility] [place] MAIN OR    Anesthesia Start: 0920 Anesthesia Stop: 3398    Procedure: THYROIDECTOMY (Neck) Diagnosis:       Graves' disease      Multinodular goiter      (Graves' disease [E05.00])      (Multinodular goiter [E04.2])    Surgeons: Shania Espinal MD Responsible Provider: Helen Perez MD    Anesthesia Type: General ASA Status: 2          Anesthesia Type: General    John Phase I: John Score: 8    John Phase II:        Anesthesia Post Evaluation    Patient location during evaluation: PACU  Patient participation: complete - patient participated  Level of consciousness: awake and alert  Airway patency: patent  Nausea: well controlled. Complications: no  Cardiovascular status: acceptable.   Respiratory status: acceptable  Hydration status: stable

## 2023-03-03 NOTE — H&P
[facility] Office Note     Patient: Rg Lee  MRN: [id number]  : 1980  Gender:  female  Vital Signs: BP (!) 150/78 (Site: Left Upper Arm, Position: Sitting)   Ht 5' 6\" (1.676 m)   Wt 232 lb 6.4 oz (105.4 kg)   BMI 37.51 kg/m²   Date: 2022     CC:        Chief Complaint   Patient presents with    Other       Patient here for throat issues. She states she has a referral for thyroid also. HPI:  Rg Lee is a 36 y.o. female with a history of LPR, allergic rhinitis, Graves' disease, and multinodular goiter. She feels like omeprazole has helped her reflux. She still has occasional mucus in her throat in the morning and globus sensation but this is improved. She uses Flonase and denies any significant nasal obstruction. She has Graves' disease and takes methimazole. She endorses having trouble regulating her thyroid hormone levels. She also has a goiter and endorses neck fullness and occasional shortness of breath. Past Medical History, Past Surgical History, Family history, Social History, and Medications were all reviewed with the patient today and updated as necessary. No Known Allergies      Patient Active Problem List   Diagnosis    History of cholecystectomy    Lung nodule    Hyperthyroidism    Smoker    History of chlamydia    Essential hypertension    Marijuana use    Type 2 diabetes mellitus with hyperglycemia, without long-term current use of insulin ([facility] [address]    Seropositive for herpes simplex 2 infection    Obesity, morbid (HCC)           Current Outpatient Medications   Medication Sig    methIMAzole (TAPAZOLE) 10 MG tablet Take 5 tablets by mouth daily    atropine 1 % ophthalmic solution ADMINISTER 1 DROP IN THE RIGHT EYE TWICE DAILY FOR 7 DAYS. fluticasone (FLONASE) 50 MCG/ACT nasal spray 1 spray by Each Nostril route in the morning.     omeprazole (PRILOSEC) 40 MG delayed release capsule Take 1 capsule by mouth every morning (before breakfast)    Blood Glucose Monitoring Suppl (ACCU-CHEK GUIDE) w/Device KIT by Other route See Admin Instructions    Lancets 28G MISC Check sugar twice daily    blood glucose test strips (ACCU-CHEK GUIDE) strip Check sugars twice daily, dx diabetes mellitus uncontrolled    SUMAtriptan (IMITREX) 50 MG tablet TAKE 1 TABLET BY MOUTH 1 TIME FOR UP TO 1 DOSE AS NEEDED FOR MIGRAINE    Lancets MISC Check sugar twice daily    acetaminophen (TYLENOL) 325 MG tablet Take 650 mg by mouth    diclofenac (VOLTAREN) 75 MG EC tablet Take 75 mg by mouth 2 times daily    ibuprofen (ADVIL;MOTRIN) 800 MG tablet Take 800 mg by mouth every 8 hours as needed    medroxyPROGESTERone (DEPO-PROVERA) 150 MG/ML injection ADMINISTER 1 ML IN THE MUSCLE 1 TIME FOR 1 DOSE    metFORMIN (GLUCOPHAGE-XR) 500 MG extended release tablet Take 500 mg by mouth Daily with supper    NIFEdipine (PROCARDIA XL) 30 MG extended release tablet Take 30 mg by mouth daily      No current facility-administered medications for this visit. Past Medical History        Past Medical History:   Diagnosis Date    Abnormal Pap smear of cervix 2022    Abnormal Papanicolaou smear of cervix       resolved    Anemia      Diabetes ([facility] [address] 2015     metformin only- does not take BS at home- does not know last A1c- denies hypoglycemia    Essential hypertension       no current medications--since 2015    Genital herpes       never had an outbreak    Gestational diabetes      History of chlamydia       tx    History of cholecystectomy 06/25/2016    Hypertension       [facility]TN not currently on med    Hyperthyroidism       was on Methimazole during last preg. not currently on anything. Marijuana use       twice daily per pt    Obesity (BMI 30-39.9) 09/28/2016     BMI 37.2    Smoker       5 cig daily since age 14    Ventricular septal defect (VSD) of fetus in villalobos pregnancy, antepartum 11/9/2017 11/9/2017 at [facility]:  Appropriate fetal growth, reassuring fetal status; small suspected VSD seen.   AC 55%, overall 64%, CHARLENE 17 cm, UA Dopplers WNL, BPP 8/8. 2017 [facility]: Peds cards echo today, see report from Dr Bradley Hernandez; unable to see VSDs. Recommend  follow-up 2 weeks after delivery. 2017 at [facility]: Appropriate fetal growth without IUGR, reassuring fetal status. AC 64, overall 49%,         Social History            Tobacco Use    Smoking status: Every Day       Packs/day: 0.25       Types: Cigarettes       Start date: 1994    Smokeless tobacco: Never    Tobacco comments:       Quit smokin-5 cigarettes daily   Substance Use Topics    Alcohol use: Yes      Past Surgical History         Past Surgical History:   Procedure Laterality Date     SECTION   2019    CHOLECYSTECTOMY, LAPAROSCOPIC        COLONOSCOPY   2022    GI         EGD    GYN             Family History         Family History   Problem Relation Age of Onset    Cancer Mother           cervical    Diabetes Mother      Stroke Mother      Cancer Maternal Grandmother           breast ca    Diabetes Maternal Grandmother      Heart Disease Maternal Grandmother      No Known Problems Brother              ROS:     Review of Systems   Constitutional:  Negative for fever. HENT:  Negative for ear discharge and ear pain. Eyes:  Negative for discharge. Respiratory:  Negative for cough. Cardiovascular:  Negative for chest pain. Gastrointestinal:  Negative for abdominal pain. Genitourinary:  Negative for difficulty urinating. Musculoskeletal:  Negative for neck pain. Skin:  Negative for rash. Allergic/Immunologic: Negative for environmental allergies. Neurological:  Negative for dizziness. Hematological:  Negative for adenopathy. Psychiatric/Behavioral:  Negative for agitation. PHYSICAL EXAM:     BP (!) 150/78 (Site: Left Upper Arm, Position: Sitting)   Ht 5' 6\" (1.676 m)   Wt 232 lb 6.4 oz (105.4 kg)   BMI 37.51 kg/m²      General: NAD, well-appearing  Neuro: No gross neuro deficits.  CN's II-XII intact. No facial weakness. Eyes: EOMI. Pupils reactive. No periorbital edema/ecchymosis. Skin: No facial erythema, rashes or concerning lesions. Nose: No external deviations or saddling. Intranasally, septum is midline without perforations, nasal mucosa appears healthy with no erythema, mucopurulence, or polyps. Mouth: Moist mucus membranes, normal tongue/palate mobility, no concerning mucosal lesions. Oropharynx: clear with no erythema/exudate, no tonsillar hypertrophy. Ears: Normal appearing auricles, no hematomas. EACs clear with no cerumen impaction, healthy canal skin, TM's intact with no perforations or retraction pockets. No middle ear effusions. Neck: Soft, supple, no palpable lateral neck masses. No parotid or submandibular masses. Multinodular goiter. No surgical scars. Lymphatics: No palpable cervical LAD. Resp/Lungs: No audible stridor or wheezing, CTAB  Heart: RRR  Extremities: No clubbing or cyanosis. Thyroid ultrasound. CLINICAL INDICATION: Hyperthyroidism       PROCEDURE: Real-time grayscale sonographic evaluation of the thyroid gland with   color Doppler interrogation. COMPARISON: No prior       FINDINGS:       RIGHT THYROID LOBE: The right thyroid lobe measures 7.5 x 2.4 x 2.7 cm. The   echotexture is heterogeneous. No nodules identified. There is increased   vascularity. LEFT THYROID LOBE:  The left thyroid lobe measures 6.5 x 2.5 x 3.2 cm. The   echotexture is heterogeneous. No nodules identified. There is increased   vascularity. The isthmus measures 14 mm            Impression   Enlarged heterogeneous thyroid gland with increased vascularity   without discrete nodules. Graves' disease could be considered.    08/23/2021                           TSH                 <0.005 (non compliant with treatment)                           Free T4           5.22                           Total T3           429                             02/08/2022 TSH                 <0.005                                           03/01/2022                          TSH                 <0.005                          Free T4           6.44                          Total T3           567                 04/13/2022                          TSH                 <0.005                          Free T4           2.59                          Total T3           330                 05/17/2022                          TSH                 <0.005                          Free T4           2.77                          Total T3           346  ASSESSMENT and PLAN  37-year old female with LPR, allergic rhinitis, multinodular goiter, Graves' disease. LPR well-controlled with omeprazole and allergic rhinitis controlled with Flonase. I explained options of treatment for Graves' disease which include continued treatment with methimazole, radioactive iodine ablation, and total thyroidectomy. I discussed the risks of thyroid surgery including bleeding/hematoma, damage to recurrent laryngeal nerve w/ subsequent hoarseness or stridor, possible need for tracheostomy, low calcium, need for lifetime thyroid supplementation, recurrence, and need for further procedures, and they would like to proceed. ICD-10-CM     1. Graves disease  E05.00         2. Laryngopharyngeal reflux (LPR)  K21.9         3. Goiter  E04.9         4.  Allergic rhinitis, unspecified seasonality, unspecified trigger  J30.9                  Diandra Jo MD

## 2023-03-03 NOTE — PERIOP NOTE
Dr. Doron Ortiz at bedside; he removed incision dressing and applied vaseline to area--this did not fix the DOC drain suction. He advised to hook it up to [facility] for a little bit; done.

## 2023-03-03 NOTE — PERIOP NOTE
TRANSFER - OUT REPORT:    Verbal report given to Reshma Mejia RN on Dafne Gonzales  being transferred to Room 322 for routine progression of patient care       Report consisted of patient's Situation, Background, Assessment and   Recommendations(SBAR). Information from the following report(s) Nurse Handoff Report, Surgery Report, Intake/Output, MAR, and Cardiac Rhythm SR  was reviewed with the receiving nurse. [place] Assessment: No data recorded  Lines:   Peripheral IV 03/03/23 Right Hand (Active)   Site Assessment Clean, dry & intact 03/03/23 1331   Line Status Infusing 03/03/23 1331   Line Care Connections checked and tightened 03/03/23 1331   Phlebitis Assessment No symptoms 03/03/23 1331   Infiltration Assessment 0 03/03/23 1331   Alcohol Cap Used No 03/03/23 1331   Dressing Status Clean, dry & intact 03/03/23 1331   Dressing Type Transparent 03/03/23 1331        Opportunity for questions and clarification was provided.       Patient transported with:  O2 @ room air lpm

## 2023-03-03 NOTE — ANESTHESIA PRE PROCEDURE
Department of Anesthesiology  Preprocedure Note       Name:  Paulino Kruse   Age:  36 y.o.  :  1980                                          MRN:  [id number]         Date:  3/3/2023      Surgeon: Becky Martin):  Adal Orta MD    Procedure: Procedure(s):  THYROIDECTOMY    Medications prior to admission:   Prior to Admission medications    Medication Sig Start Date End Date Taking? Authorizing Provider   ibuprofen (ADVIL;MOTRIN) 800 MG tablet Take 1 tablet by mouth 3 times daily (with meals) 23   MEHUL Andujar CNP   methIMAzole (TAPAZOLE) 10 MG tablet Take 5 tablets by mouth daily  Patient not taking: No sig reported 10/17/22   Marilyn Nichols PA-C   atropine 1 % ophthalmic solution ADMINISTER 1 DROP IN THE RIGHT EYE TWICE DAILY FOR 7 DAYS. Patient not taking: No sig reported 22   Historical Provider, MD   fluticasone (FLONASE) 50 MCG/ACT nasal spray 1 spray by Each Nostril route in the morning.   Patient not taking: No sig reported 22   Adal Orta MD   omeprazole (PRILOSEC) 40 MG delayed release capsule Take 1 capsule by mouth every morning (before breakfast)  Patient not taking: No sig reported 22   Adal Orta MD   Blood Glucose Monitoring Suppl (ACCU-CHEK GUIDE) w/Device KIT by Other route See Admin Instructions  Patient not taking: Reported on 2023 3/8/22   Historical Provider, MD   Lancets 28G MISC Check sugar twice daily  Patient not taking: Reported on 2023 3/8/22   Historical Provider, MD   blood glucose test strips (ACCU-CHEK GUIDE) strip Check sugars twice daily, dx diabetes mellitus uncontrolled  Patient not taking: Reported on 2023 3/8/22   Historical Provider, MD   SUMAtriptan (IMITREX) 50 MG tablet TAKE 1 TABLET BY MOUTH 1 TIME FOR UP TO 1 DOSE AS NEEDED FOR MIGRAINE  Patient not taking: No sig reported 3/16/22   Historical Provider, MD   Lancets MISC Check sugar twice daily  Patient not taking: Reported on 2023 3/8/22 Ar Automatic Reconciliation   acetaminophen (TYLENOL) 325 MG tablet Take 650 mg by mouth  Patient not taking: No sig reported 10/5/16   Ar Automatic Reconciliation   diclofenac (VOLTAREN) 75 MG EC tablet Take 75 mg by mouth 2 times daily  Patient not taking: Reported on 1/20/2023 4/20/21   Ar Automatic Reconciliation   ibuprofen (ADVIL;MOTRIN) 800 MG tablet Take 800 mg by mouth every 8 hours as needed  Patient not taking: Reported on 1/20/2023 3/16/22   Ar Automatic Reconciliation   medroxyPROGESTERone (DEPO-PROVERA) 150 MG/ML injection ADMINISTER 1 ML IN THE MUSCLE 1 TIME FOR 1 DOSE  Patient not taking: No sig reported 9/29/21   Ar Automatic Reconciliation   metFORMIN (GLUCOPHAGE-XR) 500 MG extended release tablet Take 500 mg by mouth Daily with supper 2/8/22   Ar Automatic Reconciliation   NIFEdipine (PROCARDIA XL) 30 MG extended release tablet Take 30 mg by mouth daily  Patient not taking: No sig reported 3/16/22   Ar Automatic Reconciliation       Current medications:    Current Facility-Administered Medications   Medication Dose Route Frequency Provider Last Rate Last Admin    ceFAZolin (ANCEF) 2000 mg in sterile water 20 mL IV syringe  2,000 mg IntraVENous Once Ariadna Figueroa MD        lidocaine 1 % injection 1 mL  1 mL IntraDERmal Once PRN Lizette Louis MD        acetaminophen (TYLENOL) tablet 1,000 mg  1,000 mg Oral Once Lizette Louis MD        fentaNYL (SUBLIMAZE) injection 100 mcg  100 mcg IntraVENous Once PRN Lizette Louis MD        lactated ringers IV soln infusion   IntraVENous Continuous Lizette Louis MD        sodium chloride flush 0.9 % injection 5-40 mL  5-40 mL IntraVENous 2 times per day Lizette Louis MD        sodium chloride flush 0.9 % injection 5-40 mL  5-40 mL IntraVENous PRN Lizette Louis MD        0.9 % sodium chloride infusion   IntraVENous PRN Lizette Louis MD        midazolam (VERSED) injection 2 mg  2 mg IntraVENous Once PRN Lizette Louis MD           Allergies:  No Known Allergies    Problem List:    Patient Active Problem List   Diagnosis Code    History of cholecystectomy Z90.49    Lung nodule R91.1    Hyperthyroidism E05.90    Smoker F17.200    History of chlamydia Z86.19    Essential hypertension I10    Marijuana use F12.90    Type 2 diabetes mellitus with hyperglycemia, without long-term current use of insulin (HCC) E11.65    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    Obesity, morbid ([facility] [address] E66.01    Graves' disease E05.00    Multinodular goiter E04.2       Past Medical History:        Diagnosis Date    Abnormal Pap smear of cervix     Abnormal Papanicolaou smear of cervix     resolved    Anemia     Arthritis     Diabetes ([facility] [address]     metformin only- does not take BS at home- does not know last A1c- denies hypoglycemia    Essential hypertension     no current medications--since     Genital herpes     never had an outbreak    Gestational diabetes     History of chlamydia     tx    History of cholecystectomy 2016    Hypertension     CHTN not currently on med    Hyperthyroidism     was on Methimazole during last preg. not currently on anything.  Marijuana use     twice daily per pt    Obesity (BMI 30-39.9) 2016    BMI 37.2    Smoker     5 cig daily since age 14    Ventricular septal defect (VSD) of fetus in villalobos pregnancy, antepartum 2017 at [facility]:  Appropriate fetal growth, reassuring fetal status; small suspected VSD seen. AC 55%, overall 64%, CHARLENE 17 cm, UA Dopplers WNL, BPP 8/8. 2017 [facility]: Peds cards echo today, see report from Dr Timothy Tolentino; unable to see VSDs. Recommend  follow-up 2 weeks after delivery. 2017 at [facility]: Appropriate fetal growth without IUGR, reassuring fetal status.   AC 64, overall 49%,       Past Surgical History:        Procedure Laterality Date     SECTION      CHOLECYSTECTOMY, LAPAROSCOPIC      COLONOSCOPY  2022    GI      EGD    GYN Social History:    Social History     Tobacco Use    Smoking status: Every Day     Packs/day: 0.25     Types: Cigarettes     Start date: 1994    Smokeless tobacco: Never    Tobacco comments:     Quit smokin-5 cigarettes daily   Substance Use Topics    Alcohol use: Not Currently                                Ready to quit: Not Answered  Counseling given: Not Answered  Tobacco comments: Quit smokin-5 cigarettes daily      Vital Signs (Current):   Vitals:    23 0815 23 0659   BP:  (!) 147/78   Pulse:  73   Resp:  16   Temp:  97.1 °F (36.2 °C)   TempSrc:  Temporal   SpO2:  96%   Weight: 225 lb (102.1 kg) 222 lb 1.6 oz (100.7 kg)   Height: 5' 6\" (1.676 m) 5' 6\" (1.676 m)                                              BP Readings from Last 3 Encounters:   23 (!) 147/78   23 128/66   22 (!) 150/78       NPO Status: Time of last liquid consumption: 0500 (sip with med)                        Time of last solid consumption: 1900                        Date of last liquid consumption: 23                        Date of last solid food consumption: 23    BMI:   Wt Readings from Last 3 Encounters:   23 222 lb 1.6 oz (100.7 kg)   23 226 lb (102.5 kg)   22 232 lb 6.4 oz (105.4 kg)     Body mass index is 35.85 kg/m².     CBC:   Lab Results   Component Value Date/Time    WBC 6.2 2022 11:07 AM    RBC 5.51 2022 11:07 AM    HGB 11.8 2023 12:09 PM    HCT 37.7 2022 11:07 AM    MCV 68 2022 11:07 AM    RDW 15.7 2022 11:07 AM     2022 11:07 AM       CMP:   Lab Results   Component Value Date/Time     2022 09:16 AM    K 3.8 2022 09:16 AM     2022 09:16 AM    CO2 20 2022 09:16 AM    BUN 7 2022 09:16 AM    CREATININE 0.40 2022 09:16 AM    GFRAA >60 2021 01:03 PM    AGRATIO 1.0 2022 09:16 AM    LABGLOM 127 2022 09:16 AM    GLUCOSE 139 2022 09:16 AM    PROT 7.8 05/17/2022 10:55 AM    CALCIUM 9.6 03/01/2022 09:16 AM    BILITOT <0.2 05/17/2022 10:55 AM    ALKPHOS 358 05/17/2022 10:55 AM    AST 17 05/17/2022 10:55 AM    ALT 14 05/17/2022 10:55 AM       POC Tests: No results for input(s): POCGLU, POCNA, POCK, POCCL, POCBUN, POCHEMO, POCHCT in the last 72 hours. Coags: No results found for: PROTIME, INR, APTT    HCG (If Applicable): No results found for: PREGTESTUR, PREGSERUM, HCG, HCGQUANT     ABGs: No results found for: PHART, PO2ART, GEN2KNU, CXS4KRL, BEART, Z3WVMZNC     Type & Screen (If Applicable):  No results found for: LABABO, LABRH    Drug/Infectious Status (If Applicable):  No results found for: HIV, HEPCAB    COVID-19 Screening (If Applicable):   Lab Results   Component Value Date/Time    COVID19 Detected 12/23/2021 02:37 PM           Anesthesia Evaluation  Patient summary reviewed and Nursing notes reviewed no history of anesthetic complications:   Airway: Mallampati: III  TM distance: >3 FB   Neck ROM: full  Comment: Large goiter  Mouth opening: > = 3 FB   Dental: normal exam         Pulmonary: breath sounds clear to auscultation  (+) COPD:  asthma: current smoker                           Cardiovascular:  Exercise tolerance: good (>4 METS),   (+) hypertension:, murmur: Grade 1, hyperlipidemia        Rhythm: regular  Rate: normal                    Neuro/Psych:   Negative Neuro/Psych ROS              GI/Hepatic/Renal:            ROS comment: Obesity . Endo/Other:    (+) DiabetesType II DM, , hyperthyroidism::., .                 Abdominal:             Vascular: negative vascular ROS. Other Findings:           Anesthesia Plan      general     ASA 2     (GETA, glidescope )  Induction: intravenous. Anesthetic plan and risks discussed with patient.                         Kayli Telles MD   3/3/2023

## 2023-03-03 NOTE — PERIOP NOTE
Upon arrival to pacu, patient's DOC drain was noted to be expanded; I compressed it and within a few minutes it was again not holding suction. I notified Dr. Hiro Brooksuty; he asked me to apply a pressure dressing on top of existing dressing. This did not alleviate the problem; I notified Dr. Hiro Valdez and have not received any addional orders at this time.

## 2023-03-04 VITALS
DIASTOLIC BLOOD PRESSURE: 66 MMHG | TEMPERATURE: 98.3 F | WEIGHT: 222.1 LBS | OXYGEN SATURATION: 93 % | HEART RATE: 90 BPM | SYSTOLIC BLOOD PRESSURE: 154 MMHG | HEIGHT: 66 IN | BODY MASS INDEX: 35.69 KG/M2 | RESPIRATION RATE: 18 BRPM

## 2023-03-04 LAB
CA-I SERPL ISE-MCNC: 4.8 MG/DL (ref 4.5–5.6)
CALCIUM SERPL-MCNC: 9.3 MG/DL (ref 8.3–10.4)
CALCIUM SERPL-MCNC: 9.4 MG/DL (ref 8.3–10.4)
Lab: NORMAL
PTH-INTACT SERPL-MCNC: 30 PG/ML (ref 18.5–88)
PTH-INTACT SERPL-MCNC: 42 PG/ML (ref 18.5–88)
REFERENCE LAB: NORMAL

## 2023-03-04 PROCEDURE — 2580000003 HC RX 258: Performed by: STUDENT IN AN ORGANIZED HEALTH CARE EDUCATION/TRAINING PROGRAM

## 2023-03-04 PROCEDURE — 6360000002 HC RX W HCPCS: Performed by: STUDENT IN AN ORGANIZED HEALTH CARE EDUCATION/TRAINING PROGRAM

## 2023-03-04 PROCEDURE — 36415 COLL VENOUS BLD VENIPUNCTURE: CPT

## 2023-03-04 PROCEDURE — 82330 ASSAY OF CALCIUM: CPT

## 2023-03-04 PROCEDURE — 83970 ASSAY OF PARATHORMONE: CPT

## 2023-03-04 PROCEDURE — 6370000000 HC RX 637 (ALT 250 FOR IP): Performed by: STUDENT IN AN ORGANIZED HEALTH CARE EDUCATION/TRAINING PROGRAM

## 2023-03-04 PROCEDURE — G0378 HOSPITAL OBSERVATION PER HR: HCPCS

## 2023-03-04 RX ADMIN — Medication 2000 MG: at 09:18

## 2023-03-04 RX ADMIN — CALCIUM 1000 MG: 500 TABLET ORAL at 09:13

## 2023-03-04 RX ADMIN — Medication 2000 MG: at 02:30

## 2023-03-04 RX ADMIN — OXYCODONE HYDROCHLORIDE 10 MG: 5 TABLET ORAL at 07:28

## 2023-03-04 RX ADMIN — TAMSULOSIN HYDROCHLORIDE 0.4 MG: 0.4 CAPSULE ORAL at 09:14

## 2023-03-04 RX ADMIN — SODIUM CHLORIDE, PRESERVATIVE FREE 10 ML: 5 INJECTION INTRAVENOUS at 09:18

## 2023-03-04 RX ADMIN — MORPHINE SULFATE 2 MG: 2 INJECTION, SOLUTION INTRAMUSCULAR; INTRAVENOUS at 02:30

## 2023-03-04 RX ADMIN — LEVOTHYROXINE SODIUM 175 MCG: 0.12 TABLET ORAL at 06:19

## 2023-03-04 ASSESSMENT — ENCOUNTER SYMPTOMS
CONSTIPATION: 0
ABDOMINAL PAIN: 0
COLOR CHANGE: 0
ABDOMINAL DISTENTION: 0
STRIDOR: 0
EYE REDNESS: 0
SINUS PAIN: 0
EYE PAIN: 0
COUGH: 0
EYE DISCHARGE: 0
CHOKING: 0

## 2023-03-04 ASSESSMENT — PAIN DESCRIPTION - LOCATION
LOCATION: THROAT
LOCATION: THROAT

## 2023-03-04 ASSESSMENT — PAIN SCALES - GENERAL
PAINLEVEL_OUTOF10: 6
PAINLEVEL_OUTOF10: 10

## 2023-03-04 NOTE — DISCHARGE SUMMARY
[facility] ENT discharge note    Patient: Rosie Willis  MRN: [id number]  : 1980  Gender:  female  Vital Signs: BP (!) 154/66   Pulse 90   Temp 98.3 °F (36.8 °C) (Oral)   Resp 18   Ht 5' 6\" (1.676 m)   Wt 222 lb 1.6 oz (100.7 kg)   SpO2 93%   BMI 35.85 kg/m²   Date: 3/4/2023    CC: No chief complaint on file. HPI:  Rosie Willis is a 36 y.o. female status post total thyroidectomy. Pain controlled. No paresthesias. No shortness of breath. Voice strong. Past Medical History, Past Surgical History, Family history, Social History, and Medications were all reviewed with the patient today and updated as necessary.      No Known Allergies  Patient Active Problem List   Diagnosis    History of cholecystectomy    Lung nodule    Hyperthyroidism    Smoker    History of chlamydia    Essential hypertension    Marijuana use    Type 2 diabetes mellitus with hyperglycemia, without long-term current use of insulin (HCC)    Seropositive for herpes simplex 2 infection    Obesity, morbid (HCC)    Graves' disease    Multinodular goiter    Goiter    Graves disease     Current Facility-Administered Medications   Medication Dose Route Frequency    lactated ringers IV soln infusion   IntraVENous Continuous    sodium chloride flush 0.9 % injection 5-40 mL  5-40 mL IntraVENous 2 times per day    sodium chloride flush 0.9 % injection 5-40 mL  5-40 mL IntraVENous PRN    0.9 % sodium chloride infusion   IntraVENous PRN    polyethylene glycol (GLYCOLAX) packet 17 g  17 g Oral Daily PRN    ondansetron (ZOFRAN-ODT) disintegrating tablet 4 mg  4 mg Oral Q8H PRN    Or    ondansetron (ZOFRAN) injection 4 mg  4 mg IntraVENous Q6H PRN    calcium elemental (OSCAL) tablet 1,000 mg  1,000 mg Oral TID    levothyroxine (SYNTHROID) tablet 175 mcg  175 mcg Oral Daily    ceFAZolin (ANCEF) 2000 mg in sterile water 20 mL IV syringe  2,000 mg IntraVENous Q8H    oxyCODONE (ROXICODONE) immediate release tablet 5 mg  5 mg Oral Q4H PRN    Or oxyCODONE (ROXICODONE) immediate release tablet 10 mg  10 mg Oral Q4H PRN    morphine (PF) injection 2 mg  2 mg IntraVENous Q2H PRN    Or    morphine injection 4 mg  4 mg IntraVENous Q2H PRN    hydrALAZINE (APRESOLINE) injection 20 mg  20 mg IntraVENous Q6H PRN    tamsulosin (FLOMAX) capsule 0.4 mg  0.4 mg Oral Daily     Past Medical History:   Diagnosis Date    Abnormal Pap smear of cervix     Abnormal Papanicolaou smear of cervix     resolved    Anemia     Arthritis     Diabetes ([facility] [address]     metformin only- does not take BS at home- does not know last A1c- denies hypoglycemia    Essential hypertension     no current medications--since     Genital herpes     never had an outbreak    Gestational diabetes     History of chlamydia     tx    History of cholecystectomy 2016    Hypertension     CHTN not currently on med    Hyperthyroidism     was on Methimazole during last preg. not currently on anything. Marijuana use     twice daily per pt    Obesity (BMI 30-39.9) 2016    BMI 37.2    Smoker     5 cig daily since age 14    Ventricular septal defect (VSD) of fetus in villalobos pregnancy, antepartum 2017 at [facility]:  Appropriate fetal growth, reassuring fetal status; small suspected VSD seen. AC 55%, overall 64%, CHARLENE 17 cm, UA Dopplers WNL, BPP . 2017 [facility]: Peds cards echo today, see report from Dr Timothy Tolentino; unable to see VSDs. Recommend  follow-up 2 weeks after delivery. 2017 at [facility]: Appropriate fetal growth without IUGR, reassuring fetal status.   AC 64, overall 49%,     Social History     Tobacco Use    Smoking status: Every Day     Packs/day: 0.25     Types: Cigarettes     Start date: 1994    Smokeless tobacco: Never    Tobacco comments:     Quit smokin-5 cigarettes daily   Substance Use Topics    Alcohol use: Not Currently     Past Surgical History:   Procedure Laterality Date     SECTION  2019    CHOLECYSTECTOMY, LAPAROSCOPIC COLONOSCOPY  04/05/2022    GI      EGD    GYN       Family History   Problem Relation Age of Onset    Cancer Mother         cervical    Diabetes Mother     Stroke Mother     Cancer Maternal Grandmother         breast ca    Diabetes Maternal Grandmother     Heart Disease Maternal Grandmother     No Known Problems Brother         ROS:    Review of Systems   Constitutional:  Negative for activity change, chills and fever. HENT:  Negative for congestion, ear pain, nosebleeds, sinus pain and tinnitus. Eyes:  Negative for pain, discharge and redness. Respiratory:  Negative for cough, choking and stridor. Cardiovascular:  Negative for chest pain, palpitations and leg swelling. Gastrointestinal:  Negative for abdominal distention, abdominal pain and constipation. Endocrine: Negative for cold intolerance, polydipsia and polyuria. Genitourinary:  Negative for difficulty urinating, dysuria and frequency. Musculoskeletal:  Negative for neck pain and neck stiffness. Skin:  Negative for color change. Allergic/Immunologic: Negative for environmental allergies, food allergies and immunocompromised state. Neurological:  Negative for dizziness, facial asymmetry and numbness. Hematological:  Negative for adenopathy. Does not bruise/bleed easily. Psychiatric/Behavioral:  Negative for agitation, behavioral problems and decreased concentration. PHYSICAL EXAM:    BP (!) 154/66   Pulse 90   Temp 98.3 °F (36.8 °C) (Oral)   Resp 18   Ht 5' 6\" (1.676 m)   Wt 222 lb 1.6 oz (100.7 kg)   SpO2 93%   BMI 35.85 kg/m²     General: NAD, well-appearing  Neuro: No gross neuro deficits. CN's II-XII intact. No facial weakness. Eyes: EOMI. Pupils reactive. No periorbital edema/ecchymosis. Skin: No facial erythema, rashes or concerning lesions. Nose: No external deviations or saddling.  Intranasally, septum is midline without perforations, nasal mucosa appears healthy with no erythema, mucopurulence, or polyps. Mouth: Moist mucus membranes, normal tongue/palate mobility, no concerning mucosal lesions. Oropharynx: clear with no erythema/exudate, no tonsillar hypertrophy. Ears: Normal appearing auricles, no hematomas. EACs clear with no cerumen impaction, healthy canal skin, TM's intact with no perforations or retraction pockets. No middle ear effusions. Neck: Soft, supple, DOC drain removed, incision clean dry intact, covered with tape   Lymphatics: No palpable cervical LAD. Resp/Lungs: No audible stridor or wheezing, CTAB  Heart: RRR  Extremities: No clubbing or cyanosis. ASSESSMENT and PLAN    office follow-up 1 week. Be Case MD  3/4/2023  Electronically signed    Note dictated using voice recognition software. Please excuse any typos.

## 2023-03-04 NOTE — PROGRESS NOTES
Ok to leave DOC drain to bulb suction per Dr. Robert Fitzpatrick. Notify him with any significant neck swelling or difficulty breathing.

## 2023-03-04 NOTE — PROGRESS NOTES
Messaged Dr. Lr Copping regarding noted tear in DOC drain tubing, saturated dressing at incision site, and increased neck swelling.

## 2023-03-04 NOTE — PROGRESS NOTES
Dr. Raquel Salas sent a secure message regarding DOC drain and continuous suctioning orders. Clarification for type of drainage, amount needed for notification of concern, and continuous suctioning vacuum range he desires to maintain.

## 2023-03-04 NOTE — CARE COORDINATION
36 yr-old F with thyroidectomy for goiter. Home with family. Chart screened by  for discharge planning. No needs identified at this time. Please consult  if any new issues arise. 03/04/23 1105   Service Assessment   Patient Orientation Alert and Oriented   Cognition Alert   History Provided By Patient   Primary [address]   Patient's Healthcare Decision Maker is: Legal Next of Kin   PCP Verified by CM Yes   Last Visit to PCP Within last 6 months   Prior Functional Level Independent in ADLs/IADLs   Current Functional Level Independent in ADLs/IADLs   Can patient return to prior living arrangement Yes   Ability to make needs known: Good   Family able to assist with home care needs: Yes   Would you like for me to discuss the discharge plan with any other family members/significant others, and if so, who?  No   Financial Resources Kevin Murphy Other (Comment)

## 2023-03-04 NOTE — PROGRESS NOTES
Discharge instructions reviewed and copy provided for patient. Opportunity for questions. Patient verbalized understanding. IV was removed without difficulty.

## 2023-03-09 ENCOUNTER — OFFICE VISIT (OUTPATIENT)
Dept: ENT CLINIC | Age: 43
End: 2023-03-09

## 2023-03-09 VITALS
WEIGHT: 227 LBS | HEIGHT: 66 IN | BODY MASS INDEX: 36.48 KG/M2 | SYSTOLIC BLOOD PRESSURE: 140 MMHG | DIASTOLIC BLOOD PRESSURE: 70 MMHG

## 2023-03-09 DIAGNOSIS — E89.0 H/O THYROIDECTOMY: Primary | ICD-10-CM

## 2023-03-09 PROCEDURE — 99024 POSTOP FOLLOW-UP VISIT: CPT | Performed by: STUDENT IN AN ORGANIZED HEALTH CARE EDUCATION/TRAINING PROGRAM

## 2023-03-09 RX ORDER — LABETALOL 300 MG/1
TABLET, FILM COATED ORAL EVERY 12 HOURS SCHEDULED
COMMUNITY
Start: 2019-06-01

## 2023-03-09 RX ORDER — CALCIUM CARBONATE/VITAMIN D3 500MG-5MCG
TABLET ORAL
COMMUNITY
Start: 2023-03-03

## 2023-03-09 ASSESSMENT — ENCOUNTER SYMPTOMS
VOMITING: 0
SHORTNESS OF BREATH: 0
EYE ITCHING: 0
EYE REDNESS: 0

## 2023-03-09 NOTE — PROGRESS NOTES
[facility] Office Note    Patient: Angy Garcia  MRN: [id number]  : 1980  Gender:  female  Vital Signs: BP (!) 140/70 (Site: Left Upper Arm, Position: Sitting)   Ht 5' 6\" (1.676 m)   Wt 227 lb (103 kg)   BMI 36.64 kg/m²   Date: 3/9/2023    CC:   Chief Complaint   Patient presents with    Post-Op Check     thyroidectomy        HPI:  Angy Garcia is a 36 y.o. female status post total thyroidectomy for Graves' disease on 3-3-2023. Pathology benign. Pain controlled. No shortness of breath. No dysphagia. No paresthesias. Past Medical History, Past Surgical History, Family history, Social History, and Medications were all reviewed with the patient today and updated as necessary. No Known Allergies  Patient Active Problem List   Diagnosis    History of cholecystectomy    Lung nodule    Hyperthyroidism    Smoker    History of chlamydia    Essential hypertension    Marijuana use    Type 2 diabetes mellitus with hyperglycemia, without long-term current use of insulin ([facility] [address]    Seropositive for herpes simplex 2 infection    Obesity, morbid (HCC)    Graves' disease    Multinodular goiter    Goiter    Graves disease     Current Outpatient Medications   Medication Sig    labetalol (NORMODYNE) 300 MG tablet Take by mouth every 12 hours    OYSCO 500 + D 500-5 MG-MCG TABS TAKE 2 TABLETS THREE TIMES DAILY FOR 1 WEEK THEN 2 TABLETS TWICE DAILY FOR 1 WEEK THEN 2 TABLETS DAILY FOR 1 WEEK    levothyroxine (SYNTHROID) 175 MCG tablet Take 1 tablet by mouth daily    calcium-vitamin D (OSCAL-500) 500-5 MG-MCG TABS per tablet Take 2 pills 3 times a day for 1 week and then take 2 pills twice a day for 1 week and then take 2 pills once a day for 1 week. oxyCODONE-acetaminophen (PERCOCET) 5-325 MG per tablet Take 1 tablet by mouth every 6 hours as needed for Pain for up to 6 days. Intended supply: 3 days.  Take lowest dose possible to manage pain Max Daily Amount: 4 tablets    ibuprofen (ADVIL;MOTRIN) 800 MG tablet Take 1 tablet by mouth 3 times daily (with meals)    Blood Glucose Monitoring Suppl (ACCU-CHEK GUIDE) w/Device KIT by Other route See Admin Instructions (Patient not taking: Reported on 2023)    Lancets 28G MISC Check sugar twice daily (Patient not taking: Reported on 2023)    Lancets MISC Check sugar twice daily (Patient not taking: Reported on 2023)    metFORMIN (GLUCOPHAGE-XR) 500 MG extended release tablet Take 500 mg by mouth Daily with supper     No current facility-administered medications for this visit. Past Medical History:   Diagnosis Date    Abnormal Pap smear of cervix     Abnormal Papanicolaou smear of cervix     resolved    Anemia     Arthritis     Diabetes ([facility] [address]     metformin only- does not take BS at home- does not know last A1c- denies hypoglycemia    Essential hypertension     no current medications--since     Genital herpes     never had an outbreak    Gestational diabetes     History of chlamydia     tx    History of cholecystectomy 2016    Hypertension     CHTN not currently on med    Hyperthyroidism     was on Methimazole during last preg. not currently on anything. Marijuana use     twice daily per pt    Obesity (BMI 30-39.9) 2016    BMI 37.2    Smoker     5 cig daily since age 14    Ventricular septal defect (VSD) of fetus in villalobos pregnancy, antepartum 2017 at [facility]:  Appropriate fetal growth, reassuring fetal status; small suspected VSD seen. AC 55%, overall 64%, CHARLENE 17 cm, UA Dopplers WNL, BPP 8/8. 2017 [facility]: Peds cards echo today, see report from Dr Karon Colbert; unable to see VSDs. Recommend  follow-up 2 weeks after delivery. 2017 at [facility]: Appropriate fetal growth without IUGR, reassuring fetal status.   AC 64, overall 49%,     Social History     Tobacco Use    Smoking status: Every Day     Packs/day: 0.25     Types: Cigarettes     Start date: 1994    Smokeless tobacco: Never    Tobacco comments: Quit smokin-5 cigarettes daily   Substance Use Topics    Alcohol use: Not Currently     Past Surgical History:   Procedure Laterality Date     SECTION  2019    CHOLECYSTECTOMY, LAPAROSCOPIC      COLONOSCOPY  2022    GI      EGD    GYN      THYROIDECTOMY N/A 3/3/2023    THYROIDECTOMY performed by Brionna Savage MD at [facility]     Family History   Problem Relation Age of Onset    Cancer Mother         cervical    Diabetes Mother     Stroke Mother     Cancer Maternal Grandmother         breast ca    Diabetes Maternal Grandmother     Heart Disease Maternal Grandmother     No Known Problems Brother         ROS:    Review of Systems   Constitutional:  Negative for activity change and appetite change. HENT:  Negative for congestion, ear discharge and ear pain. Eyes:  Negative for redness and itching. Respiratory:  Negative for shortness of breath. Cardiovascular:  Negative for chest pain. Gastrointestinal:  Negative for vomiting. Musculoskeletal:  Positive for neck pain and neck stiffness. Negative for joint swelling. Allergic/Immunologic: Negative for environmental allergies. Neurological:  Negative for light-headedness. Psychiatric/Behavioral:  Negative for sleep disturbance. PHYSICAL EXAM:    BP (!) 140/70 (Site: Left Upper Arm, Position: Sitting)   Ht 5' 6\" (1.676 m)   Wt 227 lb (103 kg)   BMI 36.64 kg/m²     General: NAD, well-appearing  Neuro: No gross neuro deficits. CN's II-XII intact. No facial weakness. Eyes: EOMI. Pupils reactive. No periorbital edema/ecchymosis. Skin: No facial erythema, rashes or concerning lesions. Nose: No external deviations or saddling. Intranasally, septum is midline without perforations, nasal mucosa appears healthy with no erythema, mucopurulence, or polyps. Mouth: Moist mucus membranes, normal tongue/palate mobility, no concerning mucosal lesions. Oropharynx: clear with no erythema/exudate, no tonsillar hypertrophy.   Ears: Normal appearing auricles, no hematomas. EACs clear with no cerumen impaction, healthy canal skin, TM's intact with no perforations or retraction pockets. No middle ear effusions. Neck: Soft, supple, no palpable lateral neck masses. No parotid or submandibular masses. Prineo tape removed, incision clean dry and intact, Chovstek neg  Lymphatics: No palpable cervical LAD. Resp/Lungs: No audible stridor or wheezing, CTAB  Heart: RRR  Extremities: No clubbing or cyanosis. Latest Reference Range & Units 3/4/23 09:03   CALCIUM, SERUM, 219966 8.3 - 10.4 MG/DL 9.4      Latest Reference Range & Units 3/4/23 09:03   Pth Intact 18.5 - 88.0 pg/mL 30.0   Date Obtained:   3/3/2023   DIAGNOSIS        \"THYROID\":  MULTINODULAR HYPERPLASIA; CHANGES CONSISTENT WITH GRAVES   DISEASE. ASSESSMENT and PLAN  41-year-old female status post total thyroidectomy for Graves' disease. Path consistent with benign multinodular goiter consistent with Graves' disease. She is healing appropriately. She will follow back up as needed. She has TSH planned through endocrinology. She should continue her Synthroid. ICD-10-CM    1. H/O thyroidectomy  E89.0             Shayla Bedoya MD  3/9/2023  Electronically signed    Note dictated using voice recognition software. Please excuse any typos.

## 2023-04-25 ENCOUNTER — OFFICE VISIT (OUTPATIENT)
Dept: FAMILY MEDICINE CLINIC | Facility: CLINIC | Age: 43
End: 2023-04-25
Payer: COMMERCIAL

## 2023-04-25 VITALS
DIASTOLIC BLOOD PRESSURE: 94 MMHG | SYSTOLIC BLOOD PRESSURE: 148 MMHG | HEIGHT: 66 IN | TEMPERATURE: 98.1 F | HEART RATE: 81 BPM | OXYGEN SATURATION: 98 % | BODY MASS INDEX: 38.41 KG/M2 | WEIGHT: 239 LBS

## 2023-04-25 DIAGNOSIS — R20.0 NUMBNESS AND TINGLING OF UPPER AND LOWER EXTREMITIES OF BOTH SIDES: Primary | ICD-10-CM

## 2023-04-25 DIAGNOSIS — E89.0 S/P THYROIDECTOMY: ICD-10-CM

## 2023-04-25 DIAGNOSIS — E05.90 HYPERTHYROIDISM: ICD-10-CM

## 2023-04-25 DIAGNOSIS — R20.2 NUMBNESS AND TINGLING OF UPPER AND LOWER EXTREMITIES OF BOTH SIDES: Primary | ICD-10-CM

## 2023-04-25 LAB
BASOPHILS # BLD: 0.1 K/UL (ref 0–0.2)
BASOPHILS NFR BLD: 1 % (ref 0–2)
DIFFERENTIAL METHOD BLD: ABNORMAL
EOSINOPHIL # BLD: 0.1 K/UL (ref 0–0.8)
EOSINOPHIL NFR BLD: 1 % (ref 0.5–7.8)
ERYTHROCYTE [DISTWIDTH] IN BLOOD BY AUTOMATED COUNT: 20.4 % (ref 11.9–14.6)
HCT VFR BLD AUTO: 41.3 % (ref 35.8–46.3)
HGB BLD-MCNC: 12.7 G/DL (ref 11.7–15.4)
IMM GRANULOCYTES # BLD AUTO: 0 K/UL (ref 0–0.5)
IMM GRANULOCYTES NFR BLD AUTO: 0 % (ref 0–5)
LYMPHOCYTES # BLD: 4.3 K/UL (ref 0.5–4.6)
LYMPHOCYTES NFR BLD: 44 % (ref 13–44)
MCH RBC QN AUTO: 21.4 PG (ref 26.1–32.9)
MCHC RBC AUTO-ENTMCNC: 30.8 G/DL (ref 31.4–35)
MCV RBC AUTO: 69.5 FL (ref 82–102)
MONOCYTES # BLD: 0.3 K/UL (ref 0.1–1.3)
MONOCYTES NFR BLD: 3 % (ref 4–12)
NEUTS SEG # BLD: 4.9 K/UL (ref 1.7–8.2)
NEUTS SEG NFR BLD: 51 % (ref 43–78)
NRBC # BLD: 0 K/UL (ref 0–0.2)
PLATELET # BLD AUTO: 342 K/UL (ref 150–450)
PMV BLD AUTO: 10.6 FL (ref 9.4–12.3)
RBC # BLD AUTO: 5.94 M/UL (ref 4.05–5.2)
WBC # BLD AUTO: 9.7 K/UL (ref 4.3–11.1)

## 2023-04-25 PROCEDURE — 99214 OFFICE O/P EST MOD 30 MIN: CPT | Performed by: FAMILY MEDICINE

## 2023-04-25 PROCEDURE — 3077F SYST BP >= 140 MM HG: CPT | Performed by: FAMILY MEDICINE

## 2023-04-25 PROCEDURE — 3080F DIAST BP >= 90 MM HG: CPT | Performed by: FAMILY MEDICINE

## 2023-04-25 RX ORDER — MEDROXYPROGESTERONE ACETATE 150 MG/ML
150 INJECTION, SUSPENSION INTRAMUSCULAR
COMMUNITY
Start: 2023-03-21

## 2023-04-25 RX ORDER — METFORMIN HYDROCHLORIDE 500 MG/1
500 TABLET, EXTENDED RELEASE ORAL
Qty: 90 TABLET | Refills: 3 | Status: SHIPPED | OUTPATIENT
Start: 2023-04-25

## 2023-04-25 RX ORDER — LABETALOL 300 MG/1
300 TABLET, FILM COATED ORAL EVERY 12 HOURS SCHEDULED
Qty: 180 TABLET | Refills: 3 | Status: SHIPPED | OUTPATIENT
Start: 2023-04-25

## 2023-04-25 SDOH — ECONOMIC STABILITY: INCOME INSECURITY: HOW HARD IS IT FOR YOU TO PAY FOR THE VERY BASICS LIKE FOOD, HOUSING, MEDICAL CARE, AND HEATING?: NOT VERY HARD

## 2023-04-25 SDOH — ECONOMIC STABILITY: FOOD INSECURITY: WITHIN THE PAST 12 MONTHS, YOU WORRIED THAT YOUR FOOD WOULD RUN OUT BEFORE YOU GOT MONEY TO BUY MORE.: SOMETIMES TRUE

## 2023-04-25 SDOH — ECONOMIC STABILITY: FOOD INSECURITY: WITHIN THE PAST 12 MONTHS, THE FOOD YOU BOUGHT JUST DIDN'T LAST AND YOU DIDN'T HAVE MONEY TO GET MORE.: SOMETIMES TRUE

## 2023-04-25 SDOH — ECONOMIC STABILITY: HOUSING INSECURITY
IN THE LAST 12 MONTHS, WAS THERE A TIME WHEN YOU DID NOT HAVE A STEADY PLACE TO SLEEP OR SLEPT IN A SHELTER (INCLUDING NOW)?: NO

## 2023-04-25 ASSESSMENT — PATIENT HEALTH QUESTIONNAIRE - PHQ9
SUM OF ALL RESPONSES TO PHQ QUESTIONS 1-9: 0
SUM OF ALL RESPONSES TO PHQ QUESTIONS 1-9: 0
SUM OF ALL RESPONSES TO PHQ9 QUESTIONS 1 & 2: 0
SUM OF ALL RESPONSES TO PHQ QUESTIONS 1-9: 0
2. FEELING DOWN, DEPRESSED OR HOPELESS: 0
1. LITTLE INTEREST OR PLEASURE IN DOING THINGS: 0
SUM OF ALL RESPONSES TO PHQ QUESTIONS 1-9: 0

## 2023-04-25 NOTE — PATIENT INSTRUCTIONS
Carpal tunnel braces at night while sleeping may help  The numbness likely caused by thyroid problems, carpal tunnel, will recheck labs today.

## 2023-04-25 NOTE — PROGRESS NOTES
Page Hickey (: 1980) is a 36 y.o. female, established patient, here for evaluation of the following chief complaint(s):  Numbness (Hands and thumb in the middle of the night  arms are going numb ///)       ASSESSMENT/PLAN:  1. Numbness and tingling of upper and lower extremities of both sides  -     XR CERVICAL SPINE (4 OR 5 VIEWS); Future  -     TSH; Future  -     T4, Free; Future  -     Hemoglobin A1C; Future  -     CBC with Auto Differential; Future  -     Comprehensive Metabolic Panel; Future  -     Lipid Panel; Future  2. Hyperthyroidism  -     TSH; Future  -     T4, Free; Future  -     Hemoglobin A1C; Future  -     CBC with Auto Differential; Future  -     Comprehensive Metabolic Panel; Future  -     Lipid Panel; Future  3. S/P thyroidectomy  -     TSH; Future  -     T4, Free; Future  -     Hemoglobin A1C; Future  -     CBC with Auto Differential; Future  -     Comprehensive Metabolic Panel; Future  -     Lipid Panel; Future    Mostly numbness tingling in arms, wrists and fingers status post thyroidectomy, suspect carpal tunnel syndrome, will recheck TSH, patient has follow-up with endocrinology,  Also has follow-up with OB/GYN for Depo,  Patient to follow-up with me in 3 months, recheck diabetes and chronic conditions at that time, suspect may need hand surgery referral in the future, recommended carpal tunnel braces at night  SUBJECTIVE/OBJECTIVE:  HPI  41-year-old female recent thyroidectomy for severe hypothyroidism, taking levothyroxine,  Now off methimazole, feeling more pain in her wrists, arms, numbness when she wakes up, waking up at night,  Symptoms all started right after thyroidectomy,    Here with her 3 young children today,    Physical Exam  Vitals and nursing note reviewed. Constitutional:       General: She is not in acute distress. Appearance: Normal appearance. She is not ill-appearing. HENT:      Head: Normocephalic and atraumatic.       Right Ear: External ear normal.

## 2023-04-26 LAB
ALBUMIN SERPL-MCNC: 4 G/DL (ref 3.5–5)
ALBUMIN/GLOB SERPL: 1 (ref 0.4–1.6)
ALP SERPL-CCNC: 185 U/L (ref 50–136)
ALT SERPL-CCNC: 14 U/L (ref 12–65)
ANION GAP SERPL CALC-SCNC: 6 MMOL/L (ref 2–11)
AST SERPL-CCNC: 18 U/L (ref 15–37)
BILIRUB SERPL-MCNC: 0.3 MG/DL (ref 0.2–1.1)
BUN SERPL-MCNC: 6 MG/DL (ref 6–23)
CALCIUM SERPL-MCNC: 9.3 MG/DL (ref 8.3–10.4)
CHLORIDE SERPL-SCNC: 107 MMOL/L (ref 101–110)
CHOLEST SERPL-MCNC: 279 MG/DL
CO2 SERPL-SCNC: 25 MMOL/L (ref 21–32)
CREAT SERPL-MCNC: 1.1 MG/DL (ref 0.6–1)
EST. AVERAGE GLUCOSE BLD GHB EST-MCNC: 137 MG/DL
GLOBULIN SER CALC-MCNC: 4.2 G/DL (ref 2.8–4.5)
GLUCOSE SERPL-MCNC: 79 MG/DL (ref 65–100)
HBA1C MFR BLD: 6.4 % (ref 4.8–5.6)
HDLC SERPL-MCNC: 49 MG/DL (ref 40–60)
HDLC SERPL: 5.7
LDLC SERPL CALC-MCNC: 205 MG/DL
POTASSIUM SERPL-SCNC: 3.4 MMOL/L (ref 3.5–5.1)
PROT SERPL-MCNC: 8.2 G/DL (ref 6.3–8.2)
SODIUM SERPL-SCNC: 138 MMOL/L (ref 133–143)
T4 FREE SERPL-MCNC: 0.6 NG/DL (ref 0.78–1.46)
TRIGL SERPL-MCNC: 125 MG/DL (ref 35–150)
TSH, 3RD GENERATION: 4.8 UIU/ML (ref 0.36–3.74)
VLDLC SERPL CALC-MCNC: 25 MG/DL (ref 6–23)

## 2023-04-26 RX ORDER — LEVOTHYROXINE SODIUM 0.2 MG/1
200 TABLET ORAL DAILY
Qty: 90 TABLET | Refills: 3 | Status: SHIPPED | OUTPATIENT
Start: 2023-04-26

## 2023-06-19 ENCOUNTER — OFFICE VISIT (OUTPATIENT)
Dept: FAMILY MEDICINE CLINIC | Facility: CLINIC | Age: 43
End: 2023-06-19
Payer: COMMERCIAL

## 2023-06-19 VITALS
SYSTOLIC BLOOD PRESSURE: 112 MMHG | DIASTOLIC BLOOD PRESSURE: 82 MMHG | HEIGHT: 66 IN | HEART RATE: 73 BPM | OXYGEN SATURATION: 98 % | BODY MASS INDEX: 38.09 KG/M2 | TEMPERATURE: 98.1 F | WEIGHT: 237 LBS

## 2023-06-19 DIAGNOSIS — R20.2 NUMBNESS AND TINGLING OF UPPER AND LOWER EXTREMITIES OF BOTH SIDES: Primary | ICD-10-CM

## 2023-06-19 DIAGNOSIS — R20.0 NUMBNESS AND TINGLING OF UPPER AND LOWER EXTREMITIES OF BOTH SIDES: Primary | ICD-10-CM

## 2023-06-19 DIAGNOSIS — E05.90 HYPERTHYROIDISM: ICD-10-CM

## 2023-06-19 DIAGNOSIS — E89.0 S/P THYROIDECTOMY: ICD-10-CM

## 2023-06-19 DIAGNOSIS — L72.3 SEBACEOUS CYST: ICD-10-CM

## 2023-06-19 PROCEDURE — 3079F DIAST BP 80-89 MM HG: CPT | Performed by: FAMILY MEDICINE

## 2023-06-19 PROCEDURE — 3074F SYST BP LT 130 MM HG: CPT | Performed by: FAMILY MEDICINE

## 2023-06-19 PROCEDURE — 99214 OFFICE O/P EST MOD 30 MIN: CPT | Performed by: FAMILY MEDICINE

## 2023-06-19 RX ORDER — OYSTER SHELL CALCIUM WITH VITAMIN D 500; 200 MG/1; [IU]/1
TABLET, FILM COATED ORAL
Qty: 180 TABLET | Refills: 3 | Status: SHIPPED | OUTPATIENT
Start: 2023-06-19

## 2023-06-19 RX ORDER — DOXYCYCLINE HYCLATE 100 MG
100 TABLET ORAL 2 TIMES DAILY
Qty: 14 TABLET | Refills: 0 | Status: SHIPPED | OUTPATIENT
Start: 2023-06-19 | End: 2023-06-26

## 2023-06-19 RX ORDER — BLOOD-GLUCOSE METER
EACH MISCELLANEOUS
Qty: 1 KIT | Refills: 1 | Status: SHIPPED | OUTPATIENT
Start: 2023-06-19

## 2023-06-19 ASSESSMENT — PATIENT HEALTH QUESTIONNAIRE - PHQ9
2. FEELING DOWN, DEPRESSED OR HOPELESS: 0
SUM OF ALL RESPONSES TO PHQ QUESTIONS 1-9: 0
SUM OF ALL RESPONSES TO PHQ9 QUESTIONS 1 & 2: 0
SUM OF ALL RESPONSES TO PHQ QUESTIONS 1-9: 0
1. LITTLE INTEREST OR PLEASURE IN DOING THINGS: 0
SUM OF ALL RESPONSES TO PHQ QUESTIONS 1-9: 0
SUM OF ALL RESPONSES TO PHQ QUESTIONS 1-9: 0

## 2023-06-19 NOTE — PATIENT INSTRUCTIONS
Stop labetalol, blood pressure was on this for hyperthyroidism previously,  Refilled diabetic glucose monitor,  Sebaceous cyst ruptured on back of skull, scalp, draining some purulent, erythematous fluid, will write for doxycycline, continue warm compresses,  We will recheck TSH with change of thyroid medication recently, patient's carpal tunnel has improved significantly since medication dosage was changed to 200 mcg daily,  Patient to return in 6 months, also has follow-up with diabetes, endocrine.

## 2023-06-19 NOTE — PROGRESS NOTES
Shirley Caldwell (: 1980) is a 36 y.o. female, established patient, here for evaluation of the following chief complaint(s):  Hypothyroidism, Hypertension (Labetalol makes pt drowsy ), Diabetes (Needs another accu check machine. Old one is not working. ), Other (Leg cramps and abdomen during sex. /), and Mass (On the back of the head. /)       ASSESSMENT/PLAN:  1. Numbness and tingling of upper and lower extremities of both sides  -     TSH; Future  2. Hyperthyroidism  -     TSH; Future  3. S/P thyroidectomy  -     TSH; Future    Patient Instructions   Stop labetalol, blood pressure was on this for hyperthyroidism previously,  Refilled diabetic glucose monitor,  Sebaceous cyst ruptured on back of skull, scalp, draining some purulent, erythematous fluid, will write for doxycycline, continue warm compresses,  We will recheck TSH with change of thyroid medication recently, patient's carpal tunnel has improved significantly since medication dosage was changed to 200 mcg daily,  Patient to return in 6 months, also has follow-up with diabetes, endocrine. SUBJECTIVE/OBJECTIVE:  HPI  Lesion on back of skull, scalp, sebaceous cyst that has burst, some erythematous purulent drainage, reducing in size,  Patient using warm compresses on this,    Thyroid, status post thyroidectomy, for Graves',  Patient feeling better, dosage of medication was increased to 200 mcg about 6 weeks ago, carpal tunnel has improved significantly,  Not taking calcium, recommended taking this daily,  Diabetes well controlled, sugars in the 120s, but worried that her machine is not working    Physical Exam  Vitals and nursing note reviewed. Constitutional:       General: She is not in acute distress. Appearance: Normal appearance. She is not ill-appearing. HENT:      Head: Normocephalic and atraumatic.       Right Ear: External ear normal.      Left Ear: External ear normal.      Nose: Nose normal.      Mouth/Throat:      Mouth:

## 2023-07-19 ENCOUNTER — OFFICE VISIT (OUTPATIENT)
Dept: ENDOCRINOLOGY | Age: 43
End: 2023-07-19
Payer: COMMERCIAL

## 2023-07-19 VITALS
HEART RATE: 74 BPM | WEIGHT: 250 LBS | OXYGEN SATURATION: 98 % | DIASTOLIC BLOOD PRESSURE: 86 MMHG | SYSTOLIC BLOOD PRESSURE: 136 MMHG | BODY MASS INDEX: 40.35 KG/M2

## 2023-07-19 DIAGNOSIS — E89.0 POST-SURGICAL HYPOTHYROIDISM: Primary | ICD-10-CM

## 2023-07-19 DIAGNOSIS — E11.65 TYPE 2 DIABETES MELLITUS WITH HYPERGLYCEMIA, WITHOUT LONG-TERM CURRENT USE OF INSULIN (HCC): ICD-10-CM

## 2023-07-19 DIAGNOSIS — E89.0 POST-SURGICAL HYPOTHYROIDISM: ICD-10-CM

## 2023-07-19 DIAGNOSIS — E05.00 GRAVES DISEASE: ICD-10-CM

## 2023-07-19 LAB
ALBUMIN SERPL-MCNC: 3.5 G/DL (ref 3.5–5)
ALBUMIN/GLOB SERPL: 0.9 (ref 0.4–1.6)
ALP SERPL-CCNC: 200 U/L (ref 50–136)
ALT SERPL-CCNC: 19 U/L (ref 12–65)
ANION GAP SERPL CALC-SCNC: 9 MMOL/L (ref 2–11)
AST SERPL-CCNC: 16 U/L (ref 15–37)
BILIRUB SERPL-MCNC: 0.3 MG/DL (ref 0.2–1.1)
BUN SERPL-MCNC: 7 MG/DL (ref 6–23)
CALCIUM SERPL-MCNC: 8.6 MG/DL (ref 8.3–10.4)
CHLORIDE SERPL-SCNC: 111 MMOL/L (ref 101–110)
CHOLEST SERPL-MCNC: 204 MG/DL
CO2 SERPL-SCNC: 21 MMOL/L (ref 21–32)
CREAT SERPL-MCNC: 0.9 MG/DL (ref 0.6–1)
CREAT UR-MCNC: 151 MG/DL
GLOBULIN SER CALC-MCNC: 4 G/DL (ref 2.8–4.5)
GLUCOSE SERPL-MCNC: 90 MG/DL (ref 65–100)
HDLC SERPL-MCNC: 41 MG/DL (ref 40–60)
HDLC SERPL: 5
LDLC SERPL CALC-MCNC: 144 MG/DL
MICROALBUMIN UR-MCNC: 1.28 MG/DL
MICROALBUMIN/CREAT UR-RTO: 8 MG/G (ref 0–30)
POTASSIUM SERPL-SCNC: 3.8 MMOL/L (ref 3.5–5.1)
PROT SERPL-MCNC: 7.5 G/DL (ref 6.3–8.2)
SODIUM SERPL-SCNC: 141 MMOL/L (ref 133–143)
T4 FREE SERPL-MCNC: 2 NG/DL (ref 0.78–1.46)
TRIGL SERPL-MCNC: 95 MG/DL (ref 35–150)
TSH, 3RD GENERATION: 0.16 UIU/ML (ref 0.36–3.74)
VLDLC SERPL CALC-MCNC: 19 MG/DL (ref 6–23)

## 2023-07-19 PROCEDURE — 3075F SYST BP GE 130 - 139MM HG: CPT | Performed by: PHYSICIAN ASSISTANT

## 2023-07-19 PROCEDURE — 3044F HG A1C LEVEL LT 7.0%: CPT | Performed by: PHYSICIAN ASSISTANT

## 2023-07-19 PROCEDURE — 3079F DIAST BP 80-89 MM HG: CPT | Performed by: PHYSICIAN ASSISTANT

## 2023-07-19 PROCEDURE — 99214 OFFICE O/P EST MOD 30 MIN: CPT | Performed by: PHYSICIAN ASSISTANT

## 2023-07-19 NOTE — PROGRESS NOTES
evaluation and treatment of worsening type 2 diabetes mellitus. Review of Records: Patient with uncontrolled diabetes and possible subclinical hyperthyroidism referred for management           Date of diagnosis:    ~2015   Just prior to pregnancy   Metformin \"makes me drowsy\" and Diarrhea       Glipizide since sept 2020 after noncompliance with metformin due to SE       Denies HX pancreatitis, DKA, gastroparesis, diabetic foot ulcer       History obtained from patient      Home blood glucose monitoring frequency: <1 times per day    By recall     Typical    Fasting Low 100  As low as 100   AC lunch     AC supper     Bedtime         Blood glucose levels are stable             Diet: high carb, trying to limit portions, baked regular soda, rare ETOH       Exercise:  activity with kids, walks at time       Notes increase in blood glucose with eating bread       Body weight trend: gaining per pt                 Wt Readings from Last 3 Encounters:   07/19/23 250 lb (113.4 kg)   06/19/23 237 lb (107.5 kg)   04/25/23 239 lb (108.4 kg)                                  Wt Readings from Last 3 Encounters:        09/16/20  231 lb (104.8 kg)     07/22/20  241 lb 3.2 oz (109.4 kg)     06/18/20  246 lb (111.6 kg)             Pregnancy: depo shot       Diabetes education: The patient has received formal diabetes education.        Diabetic complications:                     Retinopathy: none                   Albuminuria/nephropathy:                           09/16/2020      Cr 0.51,                                   03/01/2022      Cr 0.40, , microalbumin/Cr ratio 8     04/25/2023 Cr 1.10, GFR >60                      Neuropathy:  Numbness, intermittently feet           Hypoglycemia: never       Hyperglycemia: without symptoms        Hemoglobin A1c:                   08/24/2016      6.0%               10/03/2017      6.1%               06/18/2020      11.6%               09/16/2020      7.3%

## 2023-07-24 ENCOUNTER — TELEPHONE (OUTPATIENT)
Dept: ENDOCRINOLOGY | Age: 43
End: 2023-07-24

## 2023-07-24 DIAGNOSIS — E89.0 POST-SURGICAL HYPOTHYROIDISM: Primary | ICD-10-CM

## 2023-07-24 RX ORDER — LEVOTHYROXINE SODIUM 0.2 MG/1
TABLET ORAL
Qty: 90 TABLET | Refills: 3
Start: 2023-07-24

## 2023-07-24 NOTE — TELEPHONE ENCOUNTER
Pt now overtreated on 200mcg dose    Recommend she continue 200mcg dose but take only a half tab on Sunday to a TDD 186mcg,    Recheck labs in 8 weeks as ordered

## 2023-08-21 RX ORDER — DOXYCYCLINE HYCLATE 100 MG
TABLET ORAL
Qty: 14 TABLET | Refills: 0 | Status: SHIPPED | OUTPATIENT
Start: 2023-08-21

## 2023-08-25 ENCOUNTER — PATIENT MESSAGE (OUTPATIENT)
Dept: FAMILY MEDICINE CLINIC | Facility: CLINIC | Age: 43
End: 2023-08-25

## 2023-08-28 ENCOUNTER — OFFICE VISIT (OUTPATIENT)
Dept: FAMILY MEDICINE CLINIC | Facility: CLINIC | Age: 43
End: 2023-08-28
Payer: COMMERCIAL

## 2023-08-28 VITALS
BODY MASS INDEX: 37.7 KG/M2 | HEIGHT: 66 IN | HEART RATE: 99 BPM | DIASTOLIC BLOOD PRESSURE: 88 MMHG | TEMPERATURE: 98.8 F | WEIGHT: 234.6 LBS | OXYGEN SATURATION: 98 % | SYSTOLIC BLOOD PRESSURE: 126 MMHG

## 2023-08-28 DIAGNOSIS — E05.90 HYPERTHYROIDISM: Primary | ICD-10-CM

## 2023-08-28 DIAGNOSIS — G89.29 CHRONIC RIGHT SHOULDER PAIN: ICD-10-CM

## 2023-08-28 DIAGNOSIS — M70.62 GREATER TROCHANTERIC BURSITIS, LEFT: ICD-10-CM

## 2023-08-28 DIAGNOSIS — E11.65 TYPE 2 DIABETES MELLITUS WITH HYPERGLYCEMIA, WITHOUT LONG-TERM CURRENT USE OF INSULIN (HCC): ICD-10-CM

## 2023-08-28 DIAGNOSIS — E89.0 S/P THYROIDECTOMY: ICD-10-CM

## 2023-08-28 DIAGNOSIS — M25.511 CHRONIC RIGHT SHOULDER PAIN: ICD-10-CM

## 2023-08-28 PROCEDURE — 99214 OFFICE O/P EST MOD 30 MIN: CPT | Performed by: FAMILY MEDICINE

## 2023-08-28 PROCEDURE — 3044F HG A1C LEVEL LT 7.0%: CPT | Performed by: FAMILY MEDICINE

## 2023-08-28 PROCEDURE — 3079F DIAST BP 80-89 MM HG: CPT | Performed by: FAMILY MEDICINE

## 2023-08-28 PROCEDURE — 3074F SYST BP LT 130 MM HG: CPT | Performed by: FAMILY MEDICINE

## 2023-08-28 RX ORDER — IBUPROFEN 800 MG/1
800 TABLET ORAL
Qty: 270 TABLET | Refills: 1 | Status: SHIPPED | OUTPATIENT
Start: 2023-08-28

## 2023-08-28 RX ORDER — AZITHROMYCIN 250 MG/1
250 TABLET, FILM COATED ORAL SEE ADMIN INSTRUCTIONS
Qty: 6 TABLET | Refills: 0 | Status: SHIPPED | OUTPATIENT
Start: 2023-08-28 | End: 2023-09-02

## 2023-08-28 ASSESSMENT — PATIENT HEALTH QUESTIONNAIRE - PHQ9
SUM OF ALL RESPONSES TO PHQ QUESTIONS 1-9: 2
SUM OF ALL RESPONSES TO PHQ9 QUESTIONS 1 & 2: 2
2. FEELING DOWN, DEPRESSED OR HOPELESS: 1
SUM OF ALL RESPONSES TO PHQ QUESTIONS 1-9: 2
1. LITTLE INTEREST OR PLEASURE IN DOING THINGS: 1
SUM OF ALL RESPONSES TO PHQ QUESTIONS 1-9: 2
SUM OF ALL RESPONSES TO PHQ QUESTIONS 1-9: 2

## 2023-08-28 ASSESSMENT — ENCOUNTER SYMPTOMS: COUGH: 1

## 2023-08-28 NOTE — PROGRESS NOTES
Osmani Callahan (: 1980) is a 36 y.o. female, established patient, here for evaluation of the following chief complaint(s):  Diabetes, Hypertension, Follow-up Chronic Condition, Cough (Clear mucus/), and Arm Pain (Right arm . Pain is coming back /)       ASSESSMENT/PLAN:  1. Hyperthyroidism  -     TSH; Future  -     Hemoglobin A1C; Future  2. S/P thyroidectomy  -     TSH; Future  -     Hemoglobin A1C; Future  3. Type 2 diabetes mellitus with hyperglycemia, without long-term current use of insulin (HCC)  -     TSH; Future  -     Hemoglobin A1C; Future  4. Greater trochanteric bursitis, left  -     ibuprofen (ADVIL;MOTRIN) 800 MG tablet; Take 1 tablet by mouth 3 times daily (with meals), Disp-270 tablet, R-1Normal  5. 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 SUBJECTIVE/OBJECTIVE:  Diabetes    Hypertension    Cough    Arm Pain     See above of    Physical Exam  Vitals and nursing note reviewed. Constitutional:       General: She is not in acute distress. Appearance: Normal appearance. She is not ill-appearing. HENT:      Head: Normocephalic and atraumatic. Right Ear: External ear normal.      Left Ear: External ear normal.      Nose: Nose normal.      Mouth/Throat:      Mouth: Mucous membranes are dry. Eyes:      Extraocular Movements: Extraocular movements intact. Pupils: Pupils are equal, round, and reactive to light. Cardiovascular:      Rate and Rhythm: Normal rate. Pulses: Normal pulses.

## 2023-11-13 ENCOUNTER — OFFICE VISIT (OUTPATIENT)
Dept: FAMILY MEDICINE CLINIC | Facility: CLINIC | Age: 43
End: 2023-11-13
Payer: COMMERCIAL

## 2023-11-13 VITALS
HEIGHT: 66 IN | HEART RATE: 85 BPM | BODY MASS INDEX: 37.51 KG/M2 | SYSTOLIC BLOOD PRESSURE: 134 MMHG | DIASTOLIC BLOOD PRESSURE: 90 MMHG | OXYGEN SATURATION: 95 % | WEIGHT: 233.4 LBS | TEMPERATURE: 98.4 F

## 2023-11-13 DIAGNOSIS — R04.2 HEMOPTYSIS: Primary | ICD-10-CM

## 2023-11-13 DIAGNOSIS — Z59.9 FINANCIAL DIFFICULTIES: ICD-10-CM

## 2023-11-13 DIAGNOSIS — E89.0 POST-SURGICAL HYPOTHYROIDISM: ICD-10-CM

## 2023-11-13 LAB
ALBUMIN SERPL-MCNC: 4.1 G/DL (ref 3.5–5)
ALBUMIN/GLOB SERPL: 1 (ref 0.4–1.6)
ALP SERPL-CCNC: 120 U/L (ref 50–136)
ALT SERPL-CCNC: 20 U/L (ref 12–65)
ANION GAP SERPL CALC-SCNC: 9 MMOL/L (ref 2–11)
AST SERPL-CCNC: 30 U/L (ref 15–37)
BASOPHILS # BLD: 0.2 K/UL (ref 0–0.2)
BASOPHILS NFR BLD: 1 % (ref 0–2)
BILIRUB SERPL-MCNC: 0.4 MG/DL (ref 0.2–1.1)
BUN SERPL-MCNC: 7 MG/DL (ref 6–23)
CALCIUM SERPL-MCNC: 8.9 MG/DL (ref 8.3–10.4)
CHLORIDE SERPL-SCNC: 106 MMOL/L (ref 101–110)
CO2 SERPL-SCNC: 23 MMOL/L (ref 21–32)
CREAT SERPL-MCNC: 1.5 MG/DL (ref 0.6–1)
DIFFERENTIAL METHOD BLD: ABNORMAL
EOSINOPHIL # BLD: 0.3 K/UL (ref 0–0.8)
EOSINOPHIL NFR BLD: 3 % (ref 0.5–7.8)
ERYTHROCYTE [DISTWIDTH] IN BLOOD BY AUTOMATED COUNT: 18 % (ref 11.9–14.6)
GLOBULIN SER CALC-MCNC: 4.3 G/DL (ref 2.8–4.5)
GLUCOSE SERPL-MCNC: 85 MG/DL (ref 65–100)
HCT VFR BLD AUTO: 43.5 % (ref 35.8–46.3)
HGB BLD-MCNC: 13.9 G/DL (ref 11.7–15.4)
IMM GRANULOCYTES # BLD AUTO: 0 K/UL (ref 0–0.5)
IMM GRANULOCYTES NFR BLD AUTO: 0 % (ref 0–5)
LYMPHOCYTES # BLD: 5.1 K/UL (ref 0.5–4.6)
LYMPHOCYTES NFR BLD: 45 % (ref 13–44)
MCH RBC QN AUTO: 23.3 PG (ref 26.1–32.9)
MCHC RBC AUTO-ENTMCNC: 32 G/DL (ref 31.4–35)
MCV RBC AUTO: 72.9 FL (ref 82–102)
MONOCYTES # BLD: 0.4 K/UL (ref 0.1–1.3)
MONOCYTES NFR BLD: 3 % (ref 4–12)
NEUTS SEG # BLD: 5.3 K/UL (ref 1.7–8.2)
NEUTS SEG NFR BLD: 47 % (ref 43–78)
NRBC # BLD: 0 K/UL (ref 0–0.2)
PLATELET # BLD AUTO: 299 K/UL (ref 150–450)
PMV BLD AUTO: 11.1 FL (ref 9.4–12.3)
POTASSIUM SERPL-SCNC: 3 MMOL/L (ref 3.5–5.1)
PROT SERPL-MCNC: 8.4 G/DL (ref 6.3–8.2)
RBC # BLD AUTO: 5.97 M/UL (ref 4.05–5.2)
SODIUM SERPL-SCNC: 138 MMOL/L (ref 133–143)
TSH, 3RD GENERATION: 73.1 UIU/ML (ref 0.36–3.74)
WBC # BLD AUTO: 11.3 K/UL (ref 4.3–11.1)

## 2023-11-13 PROCEDURE — 99214 OFFICE O/P EST MOD 30 MIN: CPT | Performed by: FAMILY MEDICINE

## 2023-11-13 PROCEDURE — 3080F DIAST BP >= 90 MM HG: CPT | Performed by: FAMILY MEDICINE

## 2023-11-13 PROCEDURE — 3075F SYST BP GE 130 - 139MM HG: CPT | Performed by: FAMILY MEDICINE

## 2023-11-13 RX ORDER — CEFDINIR 300 MG/1
300 CAPSULE ORAL 2 TIMES DAILY
Qty: 20 CAPSULE | Refills: 0 | Status: SHIPPED | OUTPATIENT
Start: 2023-11-13 | End: 2023-11-23

## 2023-11-13 RX ORDER — LEVOTHYROXINE SODIUM 0.2 MG/1
TABLET ORAL
Qty: 90 TABLET | Refills: 3 | Status: SHIPPED | OUTPATIENT
Start: 2023-11-13

## 2023-11-13 SDOH — ECONOMIC STABILITY - INCOME SECURITY: PROBLEM RELATED TO HOUSING AND ECONOMIC CIRCUMSTANCES, UNSPECIFIED: Z59.9

## 2023-11-13 ASSESSMENT — ENCOUNTER SYMPTOMS: HEMATOCHEZIA: 1

## 2023-11-13 ASSESSMENT — PATIENT HEALTH QUESTIONNAIRE - PHQ9
SUM OF ALL RESPONSES TO PHQ QUESTIONS 1-9: 1
1. LITTLE INTEREST OR PLEASURE IN DOING THINGS: 1
SUM OF ALL RESPONSES TO PHQ QUESTIONS 1-9: 1
SUM OF ALL RESPONSES TO PHQ QUESTIONS 1-9: 1
SUM OF ALL RESPONSES TO PHQ9 QUESTIONS 1 & 2: 1
2. FEELING DOWN, DEPRESSED OR HOPELESS: 0
SUM OF ALL RESPONSES TO PHQ QUESTIONS 1-9: 1

## 2023-11-13 NOTE — PATIENT INSTRUCTIONS
Restart levothyroxine, encouraged to take with a glass of water first thing in the morning, and I am also prescribing Omnicef antibiotic for possible pneumonia, patient coughing up mucus with blood over the past few days, will start Omnicef twice a day, take this with food, and will send for chest x-ray to evaluate. Rectal bleeding likely hemorrhoids with itching, recommend drink plenty of fluids to avoid constipation, if the rectal bleeding does not improve, then let us know, we can refer to GI. Referral to social work for financial difficulties, in danger of losing housing with 4 kids.

## 2023-11-13 NOTE — PROGRESS NOTES
Deatrice Mortimer (: 1980) is a 36 y.o. female, established patient, here for evaluation of the following chief complaint(s):  Nasal Congestion (Common Cold   COVID negative today. PT has had sx since 11/10/23/Coughing, mucus with blood, SOB ), Rectal Bleeding, and Arm Pain (Right arm from the wrist to the elbow. /)       ASSESSMENT/PLAN:  1. Hemoptysis  -     XR CHEST PA LAT (2 VIEWS); Future  -     CBC with Auto Differential; Future  -     Comprehensive Metabolic Panel; Future  -     Hemoglobin A1C; Future  -     TSH; Future  -     cefdinir (OMNICEF) 300 MG capsule; Take 1 capsule by mouth 2 times daily for 10 days, Disp-20 capsule, R-0Normal  2. Post-surgical hypothyroidism  -     levothyroxine (SYNTHROID) 200 MCG tablet; Take one tab Monday through Saturday and a 1/2 tab on  to TDD 186mcg, Disp-90 tablet, R-3Normal  3. Financial difficulties  -     [facility] - Care Coordination/Social Work - [address] [place] [address] Management    Patient Instructions   Restart levothyroxine, encouraged to take with a glass of water first thing in the morning, and I am also prescribing Omnicef antibiotic for possible pneumonia, patient coughing up mucus with blood over the past few days, will start Omnicef twice a day, take this with food, and will send for chest x-ray to evaluate. Rectal bleeding likely hemorrhoids with itching, recommend drink plenty of fluids to avoid constipation, if the rectal bleeding does not improve, then let us know, we can refer to GI. Referral to social work for financial difficulties, in danger of losing housing with 4 kids.         SUBJECTIVE/OBJECTIVE:  Rectal Bleeding     Arm Pain       Occasional blood with wiping, itching in anal area as well    Not taking any thyroid meds for past month, ran out of money, losing housing with her 4 children, struggling    Cough over past 4-5 days with shortness of breath and coughing up mucus with blood over past 24 hours, mild fever, fatigue is severe since going off

## 2023-11-14 LAB
EST. AVERAGE GLUCOSE BLD GHB EST-MCNC: 126 MG/DL
HBA1C MFR BLD: 6 % (ref 4.8–5.6)

## 2023-11-17 ENCOUNTER — CARE COORDINATION (OUTPATIENT)
Dept: CARE COORDINATION | Facility: CLINIC | Age: 43
End: 2023-11-17

## 2023-11-20 ENCOUNTER — CARE COORDINATION (OUTPATIENT)
Dept: CARE COORDINATION | Facility: CLINIC | Age: 43
End: 2023-11-20

## 2023-11-20 NOTE — CARE COORDINATION
2nd VM left with pt regarding referral from PCP-financial difficulties. Will try once more early next week.

## 2023-11-27 ENCOUNTER — CARE COORDINATION (OUTPATIENT)
Dept: CARE COORDINATION | Facility: CLINIC | Age: 43
End: 2023-11-27

## 2023-11-28 NOTE — CARE COORDINATION
Final attempt to reach pt. VM left requesting call back. If no call back is received within 5 business days, referral will be closed.

## 2023-12-05 ENCOUNTER — CARE COORDINATION (OUTPATIENT)
Dept: CARE COORDINATION | Facility: CLINIC | Age: 43
End: 2023-12-05

## 2025-01-08 ENCOUNTER — TELEPHONE (OUTPATIENT)
Dept: FAMILY MEDICINE CLINIC | Facility: CLINIC | Age: 45
End: 2025-01-08

## 2025-01-08 NOTE — TELEPHONE ENCOUNTER
[facility] on [address] in [facility] is requesting Levothyroxine 200 mg.  Has not been seen since 8/28/23.   MA called to make an appointment or to let us know if she is with another provider. Informed we have not seen her since 8/28/23, she would need an appt to get her levels checked before scripted this medication. Vm to patient to let her know of this information

## 2025-02-06 ENCOUNTER — OFFICE VISIT (OUTPATIENT)
Dept: FAMILY MEDICINE CLINIC | Facility: CLINIC | Age: 45
End: 2025-02-06
Payer: COMMERCIAL

## 2025-02-06 VITALS
OXYGEN SATURATION: 99 % | TEMPERATURE: 97.7 F | SYSTOLIC BLOOD PRESSURE: 156 MMHG | HEIGHT: 65 IN | WEIGHT: 234.8 LBS | BODY MASS INDEX: 39.12 KG/M2 | DIASTOLIC BLOOD PRESSURE: 96 MMHG | HEART RATE: 67 BPM

## 2025-02-06 DIAGNOSIS — M70.62 GREATER TROCHANTERIC BURSITIS, LEFT: ICD-10-CM

## 2025-02-06 DIAGNOSIS — E05.90 HYPERTHYROIDISM: ICD-10-CM

## 2025-02-06 DIAGNOSIS — Z12.31 ENCOUNTER FOR SCREENING MAMMOGRAM FOR BREAST CANCER: ICD-10-CM

## 2025-02-06 DIAGNOSIS — E11.65 TYPE 2 DIABETES MELLITUS WITH HYPERGLYCEMIA, WITHOUT LONG-TERM CURRENT USE OF INSULIN (HCC): Primary | ICD-10-CM

## 2025-02-06 DIAGNOSIS — E11.65 TYPE 2 DIABETES MELLITUS WITH HYPERGLYCEMIA, WITHOUT LONG-TERM CURRENT USE OF INSULIN (HCC): ICD-10-CM

## 2025-02-06 DIAGNOSIS — G89.29 CHRONIC RIGHT SHOULDER PAIN: ICD-10-CM

## 2025-02-06 DIAGNOSIS — Z59.9 FINANCIAL DIFFICULTIES: ICD-10-CM

## 2025-02-06 DIAGNOSIS — E89.0 POST-SURGICAL HYPOTHYROIDISM: ICD-10-CM

## 2025-02-06 DIAGNOSIS — E89.0 S/P THYROIDECTOMY: ICD-10-CM

## 2025-02-06 DIAGNOSIS — M25.511 CHRONIC RIGHT SHOULDER PAIN: ICD-10-CM

## 2025-02-06 LAB
ALBUMIN SERPL-MCNC: 3.6 G/DL (ref 3.5–5)
ALBUMIN/GLOB SERPL: 0.8 (ref 1–1.9)
ALP SERPL-CCNC: 113 U/L (ref 35–104)
ALT SERPL-CCNC: 11 U/L (ref 8–45)
ANION GAP SERPL CALC-SCNC: 12 MMOL/L (ref 7–16)
AST SERPL-CCNC: 17 U/L (ref 15–37)
BASOPHILS # BLD: 0.1 K/UL (ref 0–0.2)
BASOPHILS NFR BLD: 1.1 % (ref 0–2)
BILIRUB SERPL-MCNC: <0.2 MG/DL (ref 0–1.2)
BUN SERPL-MCNC: 6 MG/DL (ref 6–23)
CALCIUM SERPL-MCNC: 9 MG/DL (ref 8.8–10.2)
CHLORIDE SERPL-SCNC: 102 MMOL/L (ref 98–107)
CHOLEST SERPL-MCNC: 218 MG/DL (ref 0–200)
CO2 SERPL-SCNC: 24 MMOL/L (ref 20–29)
CREAT SERPL-MCNC: 1.04 MG/DL (ref 0.6–1.1)
CREAT UR-MCNC: 188 MG/DL (ref 28–217)
DIFFERENTIAL METHOD BLD: ABNORMAL
EOSINOPHIL # BLD: 0.15 K/UL (ref 0–0.8)
EOSINOPHIL NFR BLD: 1.7 % (ref 0.5–7.8)
ERYTHROCYTE [DISTWIDTH] IN BLOOD BY AUTOMATED COUNT: 15.4 % (ref 11.9–14.6)
EST. AVERAGE GLUCOSE BLD GHB EST-MCNC: 123 MG/DL
GLOBULIN SER CALC-MCNC: 4.5 G/DL (ref 2.3–3.5)
GLUCOSE SERPL-MCNC: 95 MG/DL (ref 70–99)
HBA1C MFR BLD: 5.9 % (ref 0–5.6)
HCT VFR BLD AUTO: 37.8 % (ref 35.8–46.3)
HDLC SERPL-MCNC: 39 MG/DL (ref 40–60)
HDLC SERPL: 5.5 (ref 0–5)
HGB BLD-MCNC: 11.7 G/DL (ref 11.7–15.4)
IMM GRANULOCYTES # BLD AUTO: 0.02 K/UL (ref 0–0.5)
IMM GRANULOCYTES NFR BLD AUTO: 0.2 % (ref 0–5)
LDLC SERPL CALC-MCNC: 159 MG/DL (ref 0–100)
LYMPHOCYTES # BLD: 2.97 K/UL (ref 0.5–4.6)
LYMPHOCYTES NFR BLD: 33.4 % (ref 13–44)
MCH RBC QN AUTO: 23 PG (ref 26.1–32.9)
MCHC RBC AUTO-ENTMCNC: 31 G/DL (ref 31.4–35)
MCV RBC AUTO: 74.3 FL (ref 82–102)
MICROALBUMIN UR-MCNC: <1.2 MG/DL (ref 0–20)
MICROALBUMIN/CREAT UR-RTO: NORMAL MG/G (ref 0–30)
MONOCYTES # BLD: 0.28 K/UL (ref 0.1–1.3)
MONOCYTES NFR BLD: 3.2 % (ref 4–12)
NEUTS SEG # BLD: 5.36 K/UL (ref 1.7–8.2)
NEUTS SEG NFR BLD: 60.4 % (ref 43–78)
NRBC # BLD: 0 K/UL (ref 0–0.2)
PLATELET # BLD AUTO: 343 K/UL (ref 150–450)
PMV BLD AUTO: 10.7 FL (ref 9.4–12.3)
POTASSIUM SERPL-SCNC: 3.9 MMOL/L (ref 3.5–5.1)
PROT SERPL-MCNC: 8.1 G/DL (ref 6.3–8.2)
RBC # BLD AUTO: 5.09 M/UL (ref 4.05–5.2)
SODIUM SERPL-SCNC: 138 MMOL/L (ref 136–145)
T4 FREE SERPL-MCNC: 0.2 NG/DL (ref 0.9–1.7)
TRIGL SERPL-MCNC: 99 MG/DL (ref 0–150)
TSH W FREE THYROID IF ABNORMAL: 41 UIU/ML (ref 0.27–4.2)
VLDLC SERPL CALC-MCNC: 20 MG/DL (ref 6–23)
WBC # BLD AUTO: 8.9 K/UL (ref 4.3–11.1)

## 2025-02-06 PROCEDURE — 3077F SYST BP >= 140 MM HG: CPT | Performed by: FAMILY MEDICINE

## 2025-02-06 PROCEDURE — 99215 OFFICE O/P EST HI 40 MIN: CPT | Performed by: FAMILY MEDICINE

## 2025-02-06 PROCEDURE — 3080F DIAST BP >= 90 MM HG: CPT | Performed by: FAMILY MEDICINE

## 2025-02-06 RX ORDER — AMLODIPINE BESYLATE 5 MG/1
5 TABLET ORAL DAILY
Qty: 90 TABLET | Refills: 0 | Status: SHIPPED | OUTPATIENT
Start: 2025-02-06

## 2025-02-06 RX ORDER — LEVOTHYROXINE SODIUM 200 UG/1
TABLET ORAL
Qty: 90 TABLET | Refills: 1 | Status: SHIPPED | OUTPATIENT
Start: 2025-02-06

## 2025-02-06 RX ORDER — BLOOD-GLUCOSE METER
EACH MISCELLANEOUS
Qty: 1 KIT | Refills: 1 | Status: SHIPPED | OUTPATIENT
Start: 2025-02-06

## 2025-02-06 RX ORDER — IBUPROFEN 800 MG/1
800 TABLET, FILM COATED ORAL
Qty: 270 TABLET | Refills: 1 | Status: SHIPPED | OUTPATIENT
Start: 2025-02-06

## 2025-02-06 RX ORDER — BLOOD SUGAR DIAGNOSTIC
STRIP MISCELLANEOUS
Qty: 300 EACH | Refills: 3 | Status: SHIPPED | OUTPATIENT
Start: 2025-02-06

## 2025-02-06 RX ORDER — METFORMIN HYDROCHLORIDE 500 MG/1
500 TABLET, EXTENDED RELEASE ORAL
Qty: 90 TABLET | Refills: 1 | Status: SHIPPED | OUTPATIENT
Start: 2025-02-06

## 2025-02-06 RX ORDER — LANCETS 30 GAUGE
1 EACH MISCELLANEOUS DAILY
Qty: 100 EACH | Refills: 5 | Status: SHIPPED | OUTPATIENT
Start: 2025-02-06

## 2025-02-06 SDOH — ECONOMIC STABILITY: FOOD INSECURITY: WITHIN THE PAST 12 MONTHS, YOU WORRIED THAT YOUR FOOD WOULD RUN OUT BEFORE YOU GOT MONEY TO BUY MORE.: NEVER TRUE

## 2025-02-06 SDOH — ECONOMIC STABILITY: FOOD INSECURITY: WITHIN THE PAST 12 MONTHS, THE FOOD YOU BOUGHT JUST DIDN'T LAST AND YOU DIDN'T HAVE MONEY TO GET MORE.: NEVER TRUE

## 2025-02-06 SDOH — ECONOMIC STABILITY - INCOME SECURITY: PROBLEM RELATED TO HOUSING AND ECONOMIC CIRCUMSTANCES, UNSPECIFIED: Z59.9

## 2025-02-06 ASSESSMENT — PATIENT HEALTH QUESTIONNAIRE - PHQ9
SUM OF ALL RESPONSES TO PHQ QUESTIONS 1-9: 1
2. FEELING DOWN, DEPRESSED OR HOPELESS: SEVERAL DAYS
SUM OF ALL RESPONSES TO PHQ QUESTIONS 1-9: 1
1. LITTLE INTEREST OR PLEASURE IN DOING THINGS: NOT AT ALL
SUM OF ALL RESPONSES TO PHQ QUESTIONS 1-9: 1
SUM OF ALL RESPONSES TO PHQ QUESTIONS 1-9: 1
SUM OF ALL RESPONSES TO PHQ9 QUESTIONS 1 & 2: 1

## 2025-02-06 NOTE — PATIENT INSTRUCTIONS
[facility]     [facility] - [phone]    [facility]    Will file insurance for medicare, BCBS, Cigna, Aetna, [employer],   Ms. Nelly Mace specializes in grief    [facility]  [phone] (24 hours daily)  Will file insurance for Medicaid, Medicare, worker's compensation and commercial health plans     [facility]  [phone] (free, night and weekends available also)     [facility]  Victoria Weller  Locations in [place] and [place]  [phone]  (specializes in counseling for women and girls)     [facility] (located next door)  [address] [place] [address]     [facility] [place]: -   Nessa Hernández  [phone]     [facility]  Iesha Nunn  [phone]     [facility]  Irais Mirzay (depression/stress, eating disorders, body image etc)  [phone]     [facility] (grief, stress management, marriage counseling)  Cherry Steward (adolescents, school issues)  [phone]     [facility] (deals with history of sexual abuse and is free)  Address: [address] [place] [address]  [phone]     Jovon Christianson  [phone] (bills some insurance)     [facility] (for families with addicted family members)  [email or web address]     Tori Vaengas  [phone]  Takes medicare  Offers EMDR services (eye movement and desensitization and reprocessing treatment) -- very beneficial for history of traumatic events and PTSD     [facility]  [address] [place] [address]  Telephone:  [phone]     [facility]  [address] [place] [address]  [phone]     [facility]  [phone]  [address] [facility] [place] [address]      [facility]  Address: [address] [place] [address]  [phone]     [facility]  Address: [address]

## 2025-02-06 NOTE — PROGRESS NOTES
Pupils are equal, round, and reactive to light.   Cardiovascular:      Rate and Rhythm: Normal rate.      Pulses: Normal pulses.   Pulmonary:      Effort: Pulmonary effort is normal.      Breath sounds: Normal breath sounds.   Abdominal:      General: There is no distension.   Musculoskeletal:         General: Normal range of motion.      Cervical back: Normal range of motion and neck supple.   Skin:     General: Skin is warm and dry.   Neurological:      General: No focal deficit present.      Mental Status: She is alert and oriented to person, place, and time.   Psychiatric:         Mood and Affect: Mood normal.           On this date 02/06/25  I have spent 40 minutes reviewing previous notes, test results and face to face with the patient discussing the diagnosis and importance of compliance with the treatment plan as well as documenting on the day of the visit.          An electronic signature was used to authenticate this note.  -- Darling Morales MD

## 2025-05-05 ENCOUNTER — PATIENT MESSAGE (OUTPATIENT)
Dept: FAMILY MEDICINE CLINIC | Facility: CLINIC | Age: 45
End: 2025-05-05

## 2025-05-14 DIAGNOSIS — E11.65 TYPE 2 DIABETES MELLITUS WITH HYPERGLYCEMIA, WITHOUT LONG-TERM CURRENT USE OF INSULIN (HCC): ICD-10-CM

## 2025-05-14 RX ORDER — AMLODIPINE BESYLATE 5 MG/1
5 TABLET ORAL DAILY
Qty: 90 TABLET | Refills: 0 | Status: SHIPPED | OUTPATIENT
Start: 2025-05-14

## 2025-05-14 RX ORDER — METFORMIN HYDROCHLORIDE 500 MG/1
500 TABLET, EXTENDED RELEASE ORAL
Qty: 90 TABLET | Refills: 1 | Status: SHIPPED | OUTPATIENT
Start: 2025-05-14

## (undated) DEVICE — SUTURE PERMAHAND SZ 2-0 L18IN NONABSORBABLE BLK L26MM PS 1588H

## (undated) DEVICE — SOLUTION IRRIG 1000ML 0.9% SOD CHL USP POUR PLAS BTL

## (undated) DEVICE — Device

## (undated) DEVICE — RESERVOIR,SUCTION,100CC,SILICONE: Brand: MEDLINE

## (undated) DEVICE — CORD ES L12FT BPLR FRCP

## (undated) DEVICE — KIT PROCEDURE SURG HEAD AND NECK TOTE

## (undated) DEVICE — SUTURE PERMAHAND SZ 2-0 L12X18IN NONABSORBABLE BLK SILK A185H

## (undated) DEVICE — GLOVE ORANGE PI 7 1/2   MSG9075

## (undated) DEVICE — SUTURE MCRYL SZ 5-0 L18IN ABSRB UD L13MM P-3 3/8 CIR PRIM Y493G

## (undated) DEVICE — EMG TUBE 8229707 NIM TRIVANTAGE 7.0MM ID: Brand: NIM TRIVANTAGE™

## (undated) DEVICE — DRAPE TWL SURG 16X26IN BLU ORB04] ALLCARE INC]

## (undated) DEVICE — ELECTRODE PT RET AD L9FT HI MOIST COND ADH HYDRGEL CORDED

## (undated) DEVICE — GAUZE,SPONGE,8"X4",12PLY,XRAY,STRL,LF: Brand: MEDLINE

## (undated) DEVICE — PROBE 8225101 5PK STD PRASS FL TIP ROHS

## (undated) DEVICE — SYSTEM SKIN CLSR 22CM DERMBND PRINEO

## (undated) DEVICE — GARMENT,MEDLINE,DVT,INT,CALF,MED, GEN2: Brand: MEDLINE

## (undated) DEVICE — SUTURE VCRL SZ 5-0 L18IN ABSRB UD L13MM P-3 3/8 CIR PRIM J493G

## (undated) DEVICE — DRAIN SURG 15FR 100% SIL RND END PERF

## (undated) DEVICE — DRAPE IRRIG FLD WRM W44XL44IN W/ AORN STD PRTBL INTRATEMP

## (undated) DEVICE — HOOK RETRCT DIA5MM SHRP E STAY DISP FOR LONE STAR SELF RET

## (undated) DEVICE — SPONGE SURG SM 3/8IN WHT PNUT DISECT RADPQ ST

## (undated) DEVICE — MARKER SURG SKIN UTIL BLK REG TIP NONSMEARING W/ 6IN RUL

## (undated) DEVICE — SUTURE VCRL SZ 3-0 L27IN ABSRB UD L26MM SH 1/2 CIR J416H

## (undated) DEVICE — TOTAL TRAY, DB, 100% SILI FOLEY, 16FR 10: Brand: MEDLINE

## (undated) DEVICE — AGENT HEMSTAT W1XL2IN ABSRB SFT LTWT LAYERED ORC FIBRILLAR

## (undated) DEVICE — APPLIER CLP L9.375IN APER 2.1MM CLS L3.8MM 20 SM TI CLP

## (undated) DEVICE — CANISTER, RIGID, 2000CC: Brand: MEDLINE INDUSTRIES, INC.

## (undated) DEVICE — DISSECTOR ENDOSCP L21CM TIP CURVATURE 40DEG FN CRV JAW VES

## (undated) DEVICE — DRAPE,INSTRUMENT,MAGNETIC,10X16: Brand: MEDLINE

## (undated) DEVICE — PREMIUM WET SKIN PREP TRAY: Brand: MEDLINE INDUSTRIES, INC.

## (undated) DEVICE — BLADE ES ELASTOMERIC COAT INSUL DURABLE BEND UPTO 90DEG

## (undated) DEVICE — 3M™ TEGADERM™ TRANSPARENT FILM DRESSING FRAME STYLE, 1624W, 2-3/8 IN X 2-3/4 IN (6 CM X 7 CM), 100/CT 4CT/CASE: Brand: 3M™ TEGADERM™